# Patient Record
Sex: MALE | Race: WHITE | NOT HISPANIC OR LATINO | Employment: OTHER | URBAN - METROPOLITAN AREA
[De-identification: names, ages, dates, MRNs, and addresses within clinical notes are randomized per-mention and may not be internally consistent; named-entity substitution may affect disease eponyms.]

---

## 2018-08-22 ENCOUNTER — TELEPHONE (OUTPATIENT)
Dept: TRANSPLANT | Facility: CLINIC | Age: 63
End: 2018-08-22

## 2018-08-22 NOTE — TELEPHONE ENCOUNTER
----- Message from Christie Aguero sent at 8/22/2018 11:37 AM CDT -----  We have the pt recorders and they are now pending review by the referral nurse.  By:Christie Aguero

## 2018-08-24 ENCOUNTER — TELEPHONE (OUTPATIENT)
Dept: TRANSPLANT | Facility: CLINIC | Age: 63
End: 2018-08-24

## 2018-08-24 NOTE — TELEPHONE ENCOUNTER
Pt records reviewed.  Pt will be referred to Hepatology due to cirrhosis secondary to HCV with liver mass suspicious for HCC with AFP 6360 and liver mass 7.2x7.2x5.7 cm  Initial referral received  from Dr. Gurpreet Hernandez  Referral letter sent to provider and patient.    Spoke to pt, informed of need for CD's with all imaging from [facility] starting in 10/2016 to present to be brought the his appt on 8/29 Wed Next week.  Pt agreed to obtain the imaging on a CD.  I also request the liver bx path slides for liver bx done on 7/2017 which liver mass was biopsied and results were neg.      Pt given instructions to facility with appt date and time.

## 2018-08-29 ENCOUNTER — TELEPHONE (OUTPATIENT)
Dept: HEPATOLOGY | Facility: CLINIC | Age: 63
End: 2018-08-29

## 2018-08-29 ENCOUNTER — OFFICE VISIT (OUTPATIENT)
Dept: HEPATOLOGY | Facility: CLINIC | Age: 63
End: 2018-08-29
Payer: MEDICARE

## 2018-08-29 ENCOUNTER — HOSPITAL ENCOUNTER (OUTPATIENT)
Dept: RADIOLOGY | Facility: HOSPITAL | Age: 63
Discharge: HOME | End: 2018-08-29
Attending: PHYSICIAN ASSISTANT
Payer: MEDICARE

## 2018-08-29 VITALS
SYSTOLIC BLOOD PRESSURE: 158 MMHG | DIASTOLIC BLOOD PRESSURE: 74 MMHG | TEMPERATURE: 98 F | OXYGEN SATURATION: 97 % | HEIGHT: 72 IN | HEART RATE: 84 BPM | RESPIRATION RATE: 18 BRPM | WEIGHT: 176.13 LBS | BODY MASS INDEX: 23.86 KG/M2

## 2018-08-29 DIAGNOSIS — C22.0 HCC (HEPATOCELLULAR CARCINOMA): ICD-10-CM

## 2018-08-29 DIAGNOSIS — K74.60 CIRRHOSIS OF LIVER WITHOUT ASCITES, UNSPECIFIED HEPATIC CIRRHOSIS TYPE: Primary | ICD-10-CM

## 2018-08-29 DIAGNOSIS — R16.0 LIVER MASS: Primary | ICD-10-CM

## 2018-08-29 DIAGNOSIS — R10.9 ABDOMINAL PAIN, UNSPECIFIED ABDOMINAL LOCATION: ICD-10-CM

## 2018-08-29 DIAGNOSIS — K74.60 CIRRHOSIS OF LIVER WITHOUT ASCITES, UNSPECIFIED HEPATIC CIRRHOSIS TYPE: ICD-10-CM

## 2018-08-29 PROCEDURE — 99999 PR PBB SHADOW E&M-EST. PATIENT-LVL IV: CPT | Mod: PBBFAC,,, | Performed by: PHYSICIAN ASSISTANT

## 2018-08-29 PROCEDURE — 99204 OFFICE O/P NEW MOD 45 MIN: CPT | Mod: S$PBB,,, | Performed by: PHYSICIAN ASSISTANT

## 2018-08-29 PROCEDURE — 88321 CONSLTJ&REPRT SLD PREP ELSWR: CPT | Mod: ,,, | Performed by: PATHOLOGY

## 2018-08-29 PROCEDURE — 71250 CT THORAX DX C-: CPT | Mod: TC

## 2018-08-29 PROCEDURE — 71250 CT THORAX DX C-: CPT | Mod: 26,,, | Performed by: RADIOLOGY

## 2018-08-29 PROCEDURE — 99214 OFFICE O/P EST MOD 30 MIN: CPT | Mod: PBBFAC,25 | Performed by: PHYSICIAN ASSISTANT

## 2018-08-29 RX ORDER — LEVOTHYROXINE SODIUM 88 UG/1
88 TABLET ORAL DAILY
COMMUNITY
Start: 2018-06-29

## 2018-08-29 RX ORDER — DEXLANSOPRAZOLE 60 MG/1
60 CAPSULE, DELAYED RELEASE ORAL DAILY
COMMUNITY
Start: 2018-08-13

## 2018-08-29 RX ORDER — LOVASTATIN 20 MG/1
20 TABLET ORAL NIGHTLY
COMMUNITY
Start: 2018-08-17

## 2018-08-29 RX ORDER — METFORMIN HYDROCHLORIDE 500 MG/1
500 TABLET, EXTENDED RELEASE ORAL
COMMUNITY
Start: 2018-06-29 | End: 2020-12-14 | Stop reason: CLARIF

## 2018-08-29 NOTE — PROGRESS NOTES
HEPATOLOGY CLINIC VISIT NOTE     REFERRING PROVIDER: Dr. Gurpreet Hernandez    REASON FOR VISIT: liver mass, elevated AFP     HISTORY: This is a 62 y.o. White male here for evaluation of presumed HCC, referred by outside facility. He has a h/o HCV cirrhosis, s/p treatment with IFN+RBV and victreles with SVR. He has a h/o a subcentimeter liver mass, followed by serial imaging. In 02/2018  mass noted to be 24 mm and AFP was 13.8. Mass was biopsied no evidence of HCC; benign liver parenchyma with cirrhosis and moderate steatosis. Pt reported severe abdominal pain starting 07/2018 accompanied by fatigue. He went to follow up and AFP repeated and had increased to 6360.     U/S from 08/14 notes a 1.7 cm mass, 9.8x7.0x7.8 cm mass and 2 cm mass not visualized. Pt had MRI 08/20 and a mass measuring 7.2x7.2x5.7 noted wih peripheral enhancement and pseudocapsule felt to be HCC. Furthermore, hepatic dome may be contiguous with the aforementioned mass and a second lesion may have coalesced with larger mass.     Pt was referred here for higher level of care. He has brought CD of imaging.     He has a h/o bladder carcinoma, noted 07/2017, s/p TURBT. Further PMH of HLD and DMII.     On most recent labs, AST 22, ALT 21. Tbili 0.5, albumin 4.5. PLTs >150    Pt denies signs of hepatic decompensation including: jaundice, dark urine, abdominal distention, hematemesis, melena, slowed mentation.    He reports RUQ pain, not responsive to tramadol.     He is here with his wife. Pt is a poor historian and is very simple. They have concerns about returning to [facility] due to transportation and had to borrow money to travel here today.     Past Medical History:   Diagnosis Date    Bladder cancer 2017    s/p TURBT    Diabetes     Hyperlipemia      Past Surgical History:   Procedure Laterality Date    BLADDER SURGERY      TURBT    FEMORAL BYPASS       FAMILY HISTORY: Negative for liver disease    SOCIAL HISTORY:   Social History     Tobacco Use    Smoking Status Not on file       Social History     Substance and Sexual Activity   Alcohol Use Not on file       Social History     Substance and Sexual Activity   Drug Use Not on file     ROS:   No fever, chills, weight loss, (+) fatigue  No chest pain, dyspnea, cough  (+) abdominal pain,no change in bowel pattern, nausea, vomiting  No headaches, visual changes  No lower extremity edema  No depression or anxiety      PHYSICAL EXAM:  Friendly Data Unavailable male, in no acute distress; alert and oriented to person, place and time  VITALS: reviewed  HEENT: Sclerae anicteric.   NECK: Supple  CVS: Regular rate and rhythm. No murmurs  LUNGS: Normal respiratory effort. Clear bilaterally  ABDOMEN: Flat, soft, TTP, abdominal distention   SKIN: Warm and dry. No jaundice, No obvious rashes.   EXTREMITIES: No lower extremity edema  NEURO/PSYCH: Normal gate. Memory intact. Thought and speech pattern appropriate. Behavior normal. No depression or anxiety noted.    RECENT LABS:  Lab Results   Component Value Date    WBC 7.84 08/29/2018    HGB 14.5 08/29/2018     08/29/2018     Lab Results   Component Value Date    INR 1.0 08/29/2018     Lab Results   Component Value Date    AST 25 08/29/2018    ALT 20 08/29/2018    BILITOT 0.6 08/29/2018    ALBUMIN 3.6 08/29/2018    ALKPHOS 97 08/29/2018    CREATININE 0.9 08/29/2018    BUN 12 08/29/2018     08/29/2018    K 4.2 08/29/2018    AFP 63732 (H) 08/29/2018     RECENT IMAGING:  External imaging reviewed    ASSESSMENT  62 y.o.  White male with:  1. HEPATOCELLULAR CARCINOMA WITH ELEVATED AFP   -- given rapid interval change, concern for aggressive HCC; outside of audra   -- CBC, CMP, PT/INR, AFP today  -- imaging to be uploaded for IR review for locoregional options    2. ABDOMINAL PAIN  -- CT chest    3. CIRRHOSIS DUE TO HCV  -- well compensated  -- s/p treatment with SVR  -- EGD to screen for EV and portal HTN, has not had one, order given for one locally.     PLAN:  1.  Labs today  2. Imaging to be reviewed at IR  3. F/u with Dr. Younger in 6 weeks     Thank you for allowing me to participate in the care of Brandon Montiel PA-C

## 2018-08-29 NOTE — LETTER
August 30, 2018      Gurpreet Hernandez MD  [address] [facility]  [place] [address]           [facility] - Hepatology  [address]  [place] [address]  Phone: [phone]  Fax: [phone]          Patient: Brandon Syed   MR Number: [id number]   YOB: 1955   Date of Visit: 8/29/2018       Dear Dr. Gurpreet Hernandez:    Thank you for referring Brandon Syed to me for evaluation. Attached you will find relevant portions of my assessment and plan of care.    If you have questions, please do not hesitate to call me. I look forward to following Brandon Syed along with you.    Sincerely,    Jamie Montiel PA-C    Enclosure  CC:  No Recipients    If you would like to receive this communication electronically, please contact [email or web address] or [phone] to request more information on [employer] access.    For providers and/or their staff who would like to refer a patient to [facility], please contact us through our one-stop-shop provider referral line, [facility], at [phone].    If you feel you have received this communication in error or would no longer like to receive these types of communications, please e-mail [email or web address]

## 2018-08-29 NOTE — PROGRESS NOTES
I have personally performed a face to face diagnostic evaluation on this patient. I have reviewed and agree with today's findings and the care plan outlined by Jamie Montiel PA-C      My findings are as follows:  Patient presents with HCV cirrhosis and large HCC on MRI    AFP >6000    Likely advanced HCC  IR conference review     I spoke to the patient and told him and his wife tht he likely has a cancer.    he will return to Jamie Montiel PA-C  for follow-up.

## 2018-08-30 ENCOUNTER — TELEPHONE (OUTPATIENT)
Dept: HEPATOLOGY | Facility: CLINIC | Age: 63
End: 2018-08-30

## 2018-08-30 NOTE — TELEPHONE ENCOUNTER
Patient: Brandon Syed       MRN: [id number]      : 1955     Age: 62 y.o.  [address] [place] [address]    Provider: SCOTT - HAILEY Hollis seen with Dr. Younger     Urgency of review: urgent    Patient Transplant Status: Not a candidate    Reason for presentation: Indeterminate lesion    Clinical Summary: 62 y.o. White male here for evaluation of presumed HCC, referred by outside facility. He has a h/o HCV cirrhosis, s/p treatment with IFN+RBV and victreles with SVR. He has a h/o a subcentimeter liver mass, followed by serial imaging. In 2018  mass noted to be 24 mm and AFP was 13.8. Mass was biopsied no evidence of HCC; benign liver parenchyma with cirrhosis and moderate steatosis. Pt reported severe abdominal pain starting 2018 accompanied by fatigue. He went to follow up and AFP repeated and had increased to 6360.      U/S from  notes a 1.7 cm mass, 9.8x7.0x7.8 cm mass and 2 cm mass not visualized. Pt had MRI  and a mass measuring 7.2x7.2x5.7 noted wih peripheral enhancement and pseudocapsule felt to be HCC. Furthermore, hepatic dome may be contiguous with the aforementioned mass and a second lesion may have coalesced with larger mass.      Pt was referred here for higher level of care. He has brought CD of imaging.      He has a h/o bladder carcinoma, noted 2017, s/p TURBT. Further PMH of HLD and DMII.     Given abdominal pain, CT done following visit, 2 lung masses with multiple small calcified granulomas noted.     AFP now >75829    Imaging to be reviewed: U/S, CT, MRI and CT chest    HCC Treatment History: none    ABO:     Platelets:   Lab Results   Component Value Date/Time     2018 05:00 PM     Creatinine:   Lab Results   Component Value Date/Time    CREATININE 0.9 2018 05:00 PM     Bilirubin:   Lab Results   Component Value Date/Time    BILITOT 0.6 2018 05:00 PM     AFP Last 3 each if available:   Lab Results   Component Value Date/Time    AFP  51134 (H) 08/29/2018 05:00 PM       MELD: MELD-Na score: 6 at 8/29/2018  5:00 PM  MELD score: 6 at 8/29/2018  5:00 PM  Calculated from:  Serum Creatinine: 0.9 mg/dL (Rounded to 1 mg/dL) at 8/29/2018  5:00 PM  Serum Sodium: 136 mmol/L at 8/29/2018  5:00 PM  Total Bilirubin: 0.6 mg/dL (Rounded to 1 mg/dL) at 8/29/2018  5:00 PM  INR(ratio): 1 at 8/29/2018  5:00 PM  Age: 62 years    Plan:     Follow-up Provider:

## 2018-08-31 ENCOUNTER — TELEPHONE (OUTPATIENT)
Dept: HEPATOLOGY | Facility: CLINIC | Age: 63
End: 2018-08-31

## 2018-08-31 NOTE — TELEPHONE ENCOUNTER
MA called patient he is unable to reached left him a message to his son who answered his phone. He said that his dad Is out of town. Inform him to please have patient to call us back.

## 2018-08-31 NOTE — TELEPHONE ENCOUNTER
----- Message from Jamie Montiel PA-C sent at 8/31/2018  2:02 PM CDT -----      ----- Message -----  From: Jamie Montiel PA-C  Sent: 8/31/2018   2:01 PM  To: Jamie Montiel PA-C    Please let him know that AFP continues to rise. We will review his labs and scans on Tuesday Thanks

## 2018-09-04 ENCOUNTER — TELEPHONE (OUTPATIENT)
Dept: HEPATOLOGY | Facility: CLINIC | Age: 63
End: 2018-09-04

## 2018-09-04 ENCOUNTER — CONFERENCE (OUTPATIENT)
Dept: TRANSPLANT | Facility: CLINIC | Age: 63
End: 2018-09-04

## 2018-09-04 DIAGNOSIS — C22.0 HCC (HEPATOCELLULAR CARCINOMA): Primary | ICD-10-CM

## 2018-09-04 DIAGNOSIS — R91.8 LUNG NODULES: ICD-10-CM

## 2018-09-04 NOTE — TELEPHONE ENCOUNTER
MA called patient inform him of the message below. He understood he said he have not heard anything from IR to schedule his consult he said that he is been waiting.

## 2018-09-04 NOTE — TELEPHONE ENCOUNTER
Please tell him that per Dr. Younger, he must follow up with PCP for pain and sleep trouble.     Please recall for CT chest in 6 months

## 2018-09-04 NOTE — TELEPHONE ENCOUNTER
Patient: Brandon Syed       MRN: [id number]      : 1955     Age: 62 y.o.  [address] [place] [address]    Provider: SCOTT - HAILEY Hollis seen with Dr. Younger     Urgency of review: urgent    Patient Transplant Status: Not a candidate    Reason for presentation: Indeterminate lesion    Clinical Summary: 62 y.o. White male here for evaluation of presumed HCC, referred by outside facility. He has a h/o HCV cirrhosis, s/p treatment with IFN+RBV and victreles with SVR. He has a h/o a subcentimeter liver mass, followed by serial imaging. In 2018  mass noted to be 24 mm and AFP was 13.8. Mass was biopsied no evidence of HCC; benign liver parenchyma with cirrhosis and moderate steatosis. Pt reported severe abdominal pain starting 2018 accompanied by fatigue. He went to follow up and AFP repeated and had increased to 6360.      U/S from  notes a 1.7 cm mass, 9.8x7.0x7.8 cm mass and 2 cm mass not visualized. Pt had MRI  and a mass measuring 7.2x7.2x5.7 noted wih peripheral enhancement and pseudocapsule felt to be HCC. Furthermore, hepatic dome may be contiguous with the aforementioned mass and a second lesion may have coalesced with larger mass.      Pt was referred here for higher level of care. He has brought CD of imaging.      He has a h/o bladder carcinoma, noted 2017, s/p TURBT. Further PMH of HLD and DMII.     Given abdominal pain, CT done following visit, 2 lung masses with multiple small calcified granulomas noted.     AFP now >31886    Imaging to be reviewed: U/S, CT, MRI and CT chest    HCC Treatment History: none    ABO:     Platelets:   Lab Results   Component Value Date/Time     2018 05:00 PM     Creatinine:   Lab Results   Component Value Date/Time    CREATININE 0.9 2018 05:00 PM     Bilirubin:   Lab Results   Component Value Date/Time    BILITOT 0.6 2018 05:00 PM     AFP Last 3 each if available:   Lab Results   Component Value Date/Time    AFP  18149 (H) 08/29/2018 05:00 PM       MELD: MELD-Na score: 6 at 8/29/2018  5:00 PM  MELD score: 6 at 8/29/2018  5:00 PM  Calculated from:  Serum Creatinine: 0.9 mg/dL (Rounded to 1 mg/dL) at 8/29/2018  5:00 PM  Serum Sodium: 136 mmol/L at 8/29/2018  5:00 PM  Total Bilirubin: 0.6 mg/dL (Rounded to 1 mg/dL) at 8/29/2018  5:00 PM  INR(ratio): 1 at 8/29/2018  5:00 PM  Age: 62 years    Plan:Pulmonary nodules on CT are non specific. Rec continued surveillance. Fleischer guidelines rec f/u in 6-12 months. MRI shows 8.1 cm heterogeneous mass in segments 5/9 and possibly extending to segment 7. Theres associated PVT. Appearance suggests infiltrative process which given AFP is favored to be HCC. Rec Y90 is lung shunt allows.     Irregular, heterogeneous mass; portal vein thrombus. Recommend IR consult for Y90.        Follow-up Provider: HAILEY Hollis

## 2018-09-04 NOTE — TELEPHONE ENCOUNTER
----- Message from Ricardo Younger MD sent at 9/4/2018  2:30 PM CDT -----  6 months    He should go to his PCP for a sleep aid or pain relief.    ----- Message -----  From: Jamie Montiel PA-C  Sent: 9/4/2018   2:01 PM  To: Ricardo Younger MD    IR recommended repeat CT chest in 6-12 months, what would you prefer? He is requesting something for abdominal and back pain. He states he can't sleep.

## 2018-09-05 NOTE — TELEPHONE ENCOUNTER
Pt contacted by phone and IR consult appt confirmed 9/13 at 2:00 per Dolly in IR. Pt verbalized understanding and agreement.    SCC

## 2018-09-13 ENCOUNTER — INITIAL CONSULT (OUTPATIENT)
Dept: INTERVENTIONAL RADIOLOGY/VASCULAR | Facility: CLINIC | Age: 63
End: 2018-09-13
Payer: MEDICARE

## 2018-09-13 VITALS
HEIGHT: 72 IN | BODY MASS INDEX: 23.86 KG/M2 | DIASTOLIC BLOOD PRESSURE: 75 MMHG | SYSTOLIC BLOOD PRESSURE: 156 MMHG | WEIGHT: 176.19 LBS | HEART RATE: 67 BPM

## 2018-09-13 DIAGNOSIS — C22.0 HCC (HEPATOCELLULAR CARCINOMA): Primary | ICD-10-CM

## 2018-09-13 PROCEDURE — 99999 PR PBB SHADOW E&M-EST. PATIENT-LVL III: CPT | Mod: PBBFAC,,,

## 2018-09-13 PROCEDURE — 99214 OFFICE O/P EST MOD 30 MIN: CPT | Mod: S$PBB,,, | Performed by: RADIOLOGY

## 2018-09-13 PROCEDURE — 99213 OFFICE O/P EST LOW 20 MIN: CPT | Mod: PBBFAC

## 2018-09-13 NOTE — LETTER
[facility] - Interventional Rad  [address]  [facility] [address]  Phone: [phone] PRE-PROCEDURE INSTRUCTIONS    Your procedure is scheduled for 9/27/2018. Please arrive by 10:00am.    You must check-in and receive a wristband before going to your procedure. Your check-in location is [facility].    **Do not eat or drink anything between midnight and the time of your procedure. This includes gum, mints, and dandy lemon drops.    **Do not smoke or drink alcoholic beverages 24 hours prior to your procedure.    **Bathe the night before AND the morning of your procedure with chlorohexidine wash such as Hibiclens. This helps to keep your skin as bacteria free as possible.    **If you wear contact lenses, dentures, hearing aids, or glasses, bring a container to put them in during the procedure and give them to a family member for safekeeping.    **If you have been diagnosed with sleep apnea please bring your CPAP machine.    **If your doctor has scheduled you for an overnight stay, bring a small overnight bag with any personal items that you may need.    **Make arrangements in advance for transportation home by a responsible adult. It is not safe to drive a vehicle during the 24 hours following the procedure.    **All [facility] facilities and properties are tobacco free. Smoking is NOT allowed.    PLEASE NOTE: The procedure schedule has many variables which affect the time of your procedure. Family members should be available if your surgery time changes.    If you have any questions about these instructions call Interventional Radiology at [phone] or [phone].

## 2018-09-13 NOTE — PROGRESS NOTES
Subjective:       Patient ID: Brandon Syed is a 62 y.o. male.    Chief Complaint: Hepatocellular Carcinoma    Patient here to discuss treatment of hepatocellular carcinoma (HCC) recently identified via MRI during workup for abdominal pain. He has a history of HCV that was treated. During routine surveillance in February of this year, a liver lesion was identified and biopsied. Biopsy did not show HCC. In July 2018 he went to the ER for abdominal pain. An MRI was obtained and showed the lesion had grown to 7cm. He continues to have intermittent abdominal pain. He denies any abdominal distention. He is accompanied by his daughter. He was reviewed in liver conference.       Review of Systems   Constitutional: Positive for fatigue. Negative for activity change, appetite change, chills and fever.   HENT: Positive for hearing loss (needs hearing aid) and tinnitus. Negative for congestion, drooling, sneezing and trouble swallowing.    Eyes: Negative for pain, discharge, redness and itching.        Wears glasses   Respiratory: Negative for cough, shortness of breath, wheezing and stridor.    Cardiovascular: Negative for chest pain, palpitations and leg swelling.   Gastrointestinal: Positive for abdominal pain. Negative for abdominal distention, constipation, diarrhea, nausea and vomiting.   Genitourinary: Negative for difficulty urinating, dysuria, frequency and urgency.   Musculoskeletal: Positive for arthralgias, back pain and neck pain. Negative for gait problem, joint swelling and myalgias.   Skin: Negative for color change, pallor and rash.   Neurological: Positive for weakness (occasional). Negative for dizziness and headaches.       Objective:      Physical Exam   Constitutional: He is oriented to person, place, and time. He appears well-developed and well-nourished. No distress.   HENT:   Head: Normocephalic and atraumatic.   Right Ear: External ear normal.   Left Ear: External ear normal.   Nose: Nose normal.    Mouth/Throat: Oropharynx is clear and moist. No oropharyngeal exudate.   Eyes: Conjunctivae are normal. Pupils are equal, round, and reactive to light. Right eye exhibits no discharge. Left eye exhibits no discharge. No scleral icterus.   Neck: Neck supple. No JVD present. No tracheal deviation present. No thyromegaly present.   Cardiovascular: Normal rate, regular rhythm, normal heart sounds and intact distal pulses. Exam reveals no gallop and no friction rub.   No murmur heard.  Pulmonary/Chest: Effort normal and breath sounds normal. No stridor. No respiratory distress. He has no wheezes. He has no rales.   Abdominal: Soft. Bowel sounds are normal. He exhibits no distension and no mass. There is no hepatosplenomegaly. There is no tenderness. There is no rebound and no guarding.   Musculoskeletal: He exhibits no edema.   Lymphadenopathy:     He has no cervical adenopathy.   Neurological: He is alert and oriented to person, place, and time. Gait normal.   Skin: Skin is warm and dry. He is not diaphoretic. No cyanosis. Nails show no clubbing.   Psychiatric: He has a normal mood and affect.   Vitals reviewed.      Assessment:       1. HCC (hepatocellular carcinoma)        Plan:         Discussed criteria for diagnosing HCC via imaging and AFP. Explained recommendation of liver conference is to treat with radioembolization. Yttrium 90 Radioembolization discussed in detail with the patient including risks, benefits, potential complications, usual pre and post procedure course.  Discussed the need for initial Angiogram mapping study prior to scheduling the actual Radioembolization procedure. Utilized images from patient's MRI, the internet, and illustrations to help explain procedure. Patient and daughter verbalize understanding and agreement. Patient scheduled for mapping on 9/27/2018. Pre-procedure handout with clinic phone number provided.

## 2018-09-21 ENCOUNTER — TELEPHONE (OUTPATIENT)
Dept: HEPATOLOGY | Facility: CLINIC | Age: 63
End: 2018-09-21

## 2018-09-21 NOTE — TELEPHONE ENCOUNTER
----- Message from Kath Vuong MA sent at 9/20/2018  9:34 AM CDT -----      ----- Message -----  From: Jamie Montiel PA-C  Sent: 9/20/2018   8:44 AM  To: [facility] Hepat Non-Clinical Staff    Please let him know that his original biopsy slides were reviewed and they did not note HCC on those, he should continue f/u with IR as this doesn't impact our plan

## 2018-09-26 DIAGNOSIS — C22.0 HCC (HEPATOCELLULAR CARCINOMA): Primary | ICD-10-CM

## 2018-09-27 ENCOUNTER — HOSPITAL ENCOUNTER (OUTPATIENT)
Dept: RADIOLOGY | Facility: HOSPITAL | Age: 63
Discharge: HOME | End: 2018-09-27
Attending: FAMILY MEDICINE | Admitting: RADIOLOGY
Payer: MEDICARE

## 2018-09-27 ENCOUNTER — HOSPITAL ENCOUNTER (OUTPATIENT)
Facility: HOSPITAL | Age: 63
Discharge: HOME | End: 2018-09-27
Attending: RADIOLOGY | Admitting: RADIOLOGY
Payer: MEDICARE

## 2018-09-27 VITALS
HEART RATE: 71 BPM | RESPIRATION RATE: 18 BRPM | HEIGHT: 72 IN | WEIGHT: 177 LBS | SYSTOLIC BLOOD PRESSURE: 174 MMHG | DIASTOLIC BLOOD PRESSURE: 95 MMHG | BODY MASS INDEX: 23.98 KG/M2 | TEMPERATURE: 98 F | OXYGEN SATURATION: 100 %

## 2018-09-27 DIAGNOSIS — C22.0 HCC (HEPATOCELLULAR CARCINOMA): ICD-10-CM

## 2018-09-27 LAB — POCT GLUCOSE: 116 MG/DL (ref 70–110)

## 2018-09-27 PROCEDURE — 25000003 PHARM REV CODE 250: Performed by: RADIOLOGY

## 2018-09-27 PROCEDURE — 63600175 PHARM REV CODE 636 W HCPCS: Performed by: RADIOLOGY

## 2018-09-27 PROCEDURE — 82962 GLUCOSE BLOOD TEST: CPT

## 2018-09-27 PROCEDURE — 63600175 PHARM REV CODE 636 W HCPCS: Performed by: NURSE PRACTITIONER

## 2018-09-27 PROCEDURE — A9540 TC99M MAA: HCPCS

## 2018-09-27 PROCEDURE — 78201 LIVER IMAGING STATIC ONLY: CPT | Mod: TC

## 2018-09-27 PROCEDURE — 78201 LIVER IMAGING STATIC ONLY: CPT | Mod: 26,,, | Performed by: RADIOLOGY

## 2018-09-27 PROCEDURE — 25000003 PHARM REV CODE 250: Performed by: NURSE PRACTITIONER

## 2018-09-27 RX ORDER — ONDANSETRON 2 MG/ML
INJECTION INTRAMUSCULAR; INTRAVENOUS CODE/TRAUMA/SEDATION MEDICATION
Status: COMPLETED | OUTPATIENT
Start: 2018-09-27 | End: 2018-09-27

## 2018-09-27 RX ORDER — OXYCODONE AND ACETAMINOPHEN 5; 325 MG/1; MG/1
1 TABLET ORAL EVERY 6 HOURS PRN
Qty: 15 TABLET | Refills: 0 | Status: SHIPPED | OUTPATIENT
Start: 2018-09-27 | End: 2019-07-25 | Stop reason: ALTCHOICE

## 2018-09-27 RX ORDER — ONDANSETRON 4 MG/1
4 TABLET, ORALLY DISINTEGRATING ORAL EVERY 6 HOURS PRN
Qty: 15 TABLET | Refills: 0 | Status: SHIPPED | OUTPATIENT
Start: 2018-09-27 | End: 2019-07-25 | Stop reason: ALTCHOICE

## 2018-09-27 RX ORDER — MIDAZOLAM HYDROCHLORIDE 1 MG/ML
INJECTION INTRAMUSCULAR; INTRAVENOUS CODE/TRAUMA/SEDATION MEDICATION
Status: COMPLETED | OUTPATIENT
Start: 2018-09-27 | End: 2018-09-27

## 2018-09-27 RX ORDER — LIDOCAINE HYDROCHLORIDE 10 MG/ML
1 INJECTION, SOLUTION EPIDURAL; INFILTRATION; INTRACAUDAL; PERINEURAL ONCE AS NEEDED
Status: COMPLETED | OUTPATIENT
Start: 2018-09-27 | End: 2018-09-27

## 2018-09-27 RX ORDER — AMOXICILLIN AND CLAVULANATE POTASSIUM 875; 125 MG/1; MG/1
1 TABLET, FILM COATED ORAL EVERY 12 HOURS
Qty: 14 TABLET | Refills: 0 | Status: SHIPPED | OUTPATIENT
Start: 2018-09-27 | End: 2018-10-04

## 2018-09-27 RX ORDER — HEPARIN SODIUM 1000 [USP'U]/ML
INJECTION, SOLUTION INTRAVENOUS; SUBCUTANEOUS CODE/TRAUMA/SEDATION MEDICATION
Status: COMPLETED | OUTPATIENT
Start: 2018-09-27 | End: 2018-09-27

## 2018-09-27 RX ORDER — HEPARIN SODIUM 200 [USP'U]/100ML
500 INJECTION, SOLUTION INTRAVENOUS CONTINUOUS
Status: ACTIVE | OUTPATIENT
Start: 2018-09-27

## 2018-09-27 RX ORDER — SODIUM CHLORIDE 9 MG/ML
INJECTION, SOLUTION INTRAVENOUS CONTINUOUS
Status: ACTIVE | OUTPATIENT
Start: 2018-09-27

## 2018-09-27 RX ORDER — NITROGLYCERIN 5 MG/ML
INJECTION, SOLUTION INTRAVENOUS CODE/TRAUMA/SEDATION MEDICATION
Status: COMPLETED | OUTPATIENT
Start: 2018-09-27 | End: 2018-09-27

## 2018-09-27 RX ORDER — FENTANYL CITRATE 50 UG/ML
INJECTION, SOLUTION INTRAMUSCULAR; INTRAVENOUS CODE/TRAUMA/SEDATION MEDICATION
Status: COMPLETED | OUTPATIENT
Start: 2018-09-27 | End: 2018-09-27

## 2018-09-27 RX ORDER — OXYCODONE HYDROCHLORIDE 5 MG/1
5 TABLET ORAL ONCE
Status: COMPLETED | OUTPATIENT
Start: 2018-09-27 | End: 2018-09-27

## 2018-09-27 RX ADMIN — SODIUM CHLORIDE 3 G: 9 INJECTION, SOLUTION INTRAVENOUS at 11:09

## 2018-09-27 RX ADMIN — HEPARIN SODIUM 1000 UNITS: 1000 INJECTION, SOLUTION INTRAVENOUS; SUBCUTANEOUS at 12:09

## 2018-09-27 RX ADMIN — MIDAZOLAM HYDROCHLORIDE 1 MG: 1 INJECTION, SOLUTION INTRAMUSCULAR; INTRAVENOUS at 11:09

## 2018-09-27 RX ADMIN — NITROGLYCERIN 300 MCG: 5 INJECTION, SOLUTION INTRAVENOUS at 01:09

## 2018-09-27 RX ADMIN — MIDAZOLAM HYDROCHLORIDE 0.5 MG: 1 INJECTION, SOLUTION INTRAMUSCULAR; INTRAVENOUS at 12:09

## 2018-09-27 RX ADMIN — FENTANYL CITRATE 50 MCG: 50 INJECTION, SOLUTION INTRAMUSCULAR; INTRAVENOUS at 11:09

## 2018-09-27 RX ADMIN — OXYCODONE HYDROCHLORIDE 5 MG: 5 TABLET ORAL at 02:09

## 2018-09-27 RX ADMIN — LIDOCAINE HYDROCHLORIDE 10 MG: 10 INJECTION, SOLUTION EPIDURAL; INFILTRATION; INTRACAUDAL at 11:09

## 2018-09-27 RX ADMIN — FENTANYL CITRATE 25 MCG: 50 INJECTION, SOLUTION INTRAMUSCULAR; INTRAVENOUS at 12:09

## 2018-09-27 RX ADMIN — HEPARIN SODIUM 1000 UNITS: 1000 INJECTION, SOLUTION INTRAVENOUS; SUBCUTANEOUS at 01:09

## 2018-09-27 RX ADMIN — HEPARIN SODIUM 3000 UNITS: 1000 INJECTION, SOLUTION INTRAVENOUS; SUBCUTANEOUS at 12:09

## 2018-09-27 RX ADMIN — SODIUM CHLORIDE: 0.9 INJECTION, SOLUTION INTRAVENOUS at 11:09

## 2018-09-27 RX ADMIN — ONDANSETRON 4 MG: 2 INJECTION INTRAMUSCULAR; INTRAVENOUS at 12:09

## 2018-09-27 RX ADMIN — FENTANYL CITRATE 50 MCG: 50 INJECTION, SOLUTION INTRAMUSCULAR; INTRAVENOUS at 01:09

## 2018-09-27 RX ADMIN — NITROGLYCERIN 400 MCG: 5 INJECTION, SOLUTION INTRAVENOUS at 12:09

## 2018-09-27 NOTE — PROGRESS NOTES
Pre yttrium complete. Hemostasis achieved via left wrist with use of TR band at 1310. 15 Mls air instilled. No acute events. Pt resting comfortably, denies pain/discomfort. Pt to Nuc Med for post op scan then to ROCU. Chart and all personal items sent with patient.

## 2018-09-27 NOTE — PROGRESS NOTES
TR band removed. Slight pressure dressing applied to left wrist. Dr Ramirez to bedside.  Pt and wife verbalized understanding of discharge (AVS) instructions.VSS. No complaints at this time. IV Dc'd. Pt ambulated to [facility] to travel home with wife.

## 2018-09-27 NOTE — DISCHARGE INSTRUCTIONS
Interventional Radiology [phone]  Mon-Fri  8A-4P  24hr Radiology Resident on call [phone]

## 2018-09-27 NOTE — H&P
Radiology History & Physical      SUBJECTIVE:     Chief Complaint: Preprocedure    History of Present Illness:  Brandon Syed is a 62 y.o. male who presents for Y90 mapping.  Past Medical History:   Diagnosis Date    Bladder cancer 2017    s/p TURBT    Diabetes     Hyperlipemia      Past Surgical History:   Procedure Laterality Date    BLADDER SURGERY      TURBT    FEMORAL BYPASS         Home Meds:   Prior to Admission medications    Medication Sig Start Date End Date Taking? Authorizing Provider   DEXILANT 60 mg capsule Take 60 mg by mouth once daily. 8/13/18   Historical Provider, MD   levothyroxine (SYNTHROID) 50 MCG tablet Take 50 mcg by mouth once daily. 6/29/18   Historical Provider, MD   lovastatin (MEVACOR) 20 MG tablet Take 20 mg by mouth once daily. 8/17/18   Historical Provider, MD   metFORMIN (GLUCOPHAGE-XR) 500 MG 24 hr tablet Take 500 mg by mouth once daily. 6/29/18   Historical Provider, MD     Anticoagulants/Antiplatelets: no anticoagulation    Allergies: Review of patient's allergies indicates:  No Known Allergies  Sedation History:  no adverse reactions    Review of Systems:   Hematological: no known coagulopathies  Respiratory: no shortness of breath  Cardiovascular: no chest pain  Gastrointestinal: no abdominal pain  Genito-Urinary: no dysuria  Musculoskeletal: negative  Neurological: no TIA or stroke symptoms         OBJECTIVE:     Vital Signs (Most Recent)  Temp: 98.6 °F (37 °C) (09/27/18 1025)  Pulse: 76 (09/27/18 1025)  Resp: 18 (09/27/18 1025)  BP: (!) 183/79 (09/27/18 1025)  SpO2: 98 % (09/27/18 1025)    Physical Exam:  ASA: 2  Mallampati: 2  General: no acute distress  Mental Status: alert and oriented to person, place and time  HEENT: normocephalic, atraumatic  Chest: unlabored breathing  Heart: regular heart rate  Abdomen: nondistended  Extremity: moves all extremities    Laboratory  Lab Results   Component Value Date    INR 0.9 09/27/2018       Lab Results   Component Value Date     WBC 11.47 09/27/2018    HGB 15.6 09/27/2018    HCT 46.6 09/27/2018    MCV 88 09/27/2018     09/27/2018      Lab Results   Component Value Date     (H) 09/27/2018     09/27/2018    K 4.6 09/27/2018     09/27/2018    CO2 24 09/27/2018    BUN 13 09/27/2018    CREATININE 0.9 09/27/2018    CALCIUM 10.2 09/27/2018    ALT 46 (H) 09/27/2018    AST 34 09/27/2018    ALBUMIN 4.0 09/27/2018    BILITOT 0.6 09/27/2018    BILIDIR 0.3 09/27/2018       ASSESSMENT/PLAN:     Sedation Plan: Moderate Sedation  Patient will undergo Y90 mapping.    Randall oRjo MD  Interventional Radiology PGY-2  [facility]

## 2018-09-27 NOTE — PROGRESS NOTES
Pt to [facility]. Report received from LOURDES Conrad. No complaints or signs of discomfort at this time. Will continue to monitor.  Family at bedside.

## 2018-10-02 DIAGNOSIS — C22.0 HCC (HEPATOCELLULAR CARCINOMA): Primary | ICD-10-CM

## 2018-10-08 ENCOUNTER — TELEPHONE (OUTPATIENT)
Dept: HEPATOLOGY | Facility: CLINIC | Age: 63
End: 2018-10-08

## 2018-10-08 NOTE — TELEPHONE ENCOUNTER
----- Message from Kath Vuong MA sent at 10/8/2018 12:29 PM CDT -----      ----- Message -----  From: Yashira Millan  Sent: 10/8/2018  10:55 AM  To: [facility] Hepat Clinical Staff    Pt hasn't begun radiation as of yet and wants to know should he still come to appt on 10/10    pls call to advise [phone]

## 2018-10-08 NOTE — TELEPHONE ENCOUNTER
Call returned. Pt's wife answered. Explained that pt should keep his appt with MD this week if possible. She verbalized understanding.     SCC

## 2018-10-10 ENCOUNTER — OFFICE VISIT (OUTPATIENT)
Dept: HEPATOLOGY | Facility: CLINIC | Age: 63
End: 2018-10-10
Payer: MEDICARE

## 2018-10-10 VITALS
HEART RATE: 76 BPM | WEIGHT: 176.81 LBS | DIASTOLIC BLOOD PRESSURE: 71 MMHG | BODY MASS INDEX: 23.95 KG/M2 | HEIGHT: 72 IN | SYSTOLIC BLOOD PRESSURE: 150 MMHG | TEMPERATURE: 97 F | RESPIRATION RATE: 18 BRPM | OXYGEN SATURATION: 97 %

## 2018-10-10 DIAGNOSIS — C22.0 HCC (HEPATOCELLULAR CARCINOMA): Primary | ICD-10-CM

## 2018-10-10 PROCEDURE — 99213 OFFICE O/P EST LOW 20 MIN: CPT | Mod: PBBFAC | Performed by: INTERNAL MEDICINE

## 2018-10-10 PROCEDURE — 99214 OFFICE O/P EST MOD 30 MIN: CPT | Mod: S$PBB,,, | Performed by: INTERNAL MEDICINE

## 2018-10-10 PROCEDURE — 99999 PR PBB SHADOW E&M-EST. PATIENT-LVL III: CPT | Mod: PBBFAC,,, | Performed by: INTERNAL MEDICINE

## 2018-10-10 NOTE — PROGRESS NOTES
Subjective:       Patient ID: Brandon Syed is a 62 y.o. male.    Chief Complaint: Hepatocellular Carcinoma    HPI  I saw this 62 y.o. man who was seen in Aug 2018 for evaluation of presumed HCC.    He has a h/o HCV cirrhosis, s/p treatment with IFN+RBV and victralis with SVR.     He has a h/o a subcentimeter liver mass, followed by serial imaging. In 02/2018  mass noted to be 24 mm and AFP was 13.8. Mass was biopsied no evidence of HCC; benign liver parenchyma with cirrhosis and moderate steatosis.    Pt reported severe abdominal pain starting 07/2018 accompanied by fatigue. He went to follow up and AFP repeated and had increased to 6360.      MRI 08/20 and a mass measuring 7.2x7.2x5.7 noted wih peripheral enhancement and pseudocapsule felt to be HCC. Furthermore, hepatic dome may be contiguous with the aforementioned mass and a second lesion may have coalesced with larger mass.       He has a h/o bladder carcinoma, noted 07/2017, s/p TURBT. Further PMH of HLD and DMII.      He is here with his wife.     AFP 31759 on 8/29/2018    IR conference review:  Pulmonary nodules on CT are non specific. Rec continued surveillance. Fleischer guidelines rec f/u in 6-12 months. MRI shows 8.1 cm heterogeneous mass in segments 5/9 and possibly extending to segment 7. Theres associated PVT. Appearance suggests infiltrative process which given AFP is favored to be HCC. Rec Y90.    Underwent mapping on 9/27/2018  Due for Y90 on 10/18/18      PMH:  aortofemoral bypass    Review of Systems   Constitutional: Negative for activity change, appetite change, chills, fatigue, fever and unexpected weight change.   HENT: Negative for hearing loss.    Eyes: Negative for discharge and visual disturbance.   Respiratory: Negative for cough, chest tightness, shortness of breath and wheezing.    Cardiovascular: Negative for chest pain, palpitations and leg swelling.   Gastrointestinal: Negative for abdominal distention, abdominal pain,  constipation, diarrhea and nausea.   Genitourinary: Negative for dysuria and frequency.   Musculoskeletal: Negative for arthralgias and back pain.   Skin: Negative for pallor and rash.   Neurological: Negative for dizziness, tremors, speech difficulty and headaches.   Hematological: Negative for adenopathy.   Psychiatric/Behavioral: Negative for agitation and confusion.            Lab Results   Component Value Date    ALT 46 (H) 09/27/2018    AST 34 09/27/2018    ALKPHOS 104 09/27/2018    BILITOT 0.6 09/27/2018     Past Medical History:   Diagnosis Date    Arthritis     Bladder cancer 2017    s/p TURBT    Diabetes     Hyperlipemia      Past Surgical History:   Procedure Laterality Date    BLADDER SURGERY      TURBT    EMBOLIZATION, ARTERY, LIVER, FOR SELECTIVE INTERNAL RADIATION THERAPY USING YTTRIUM-90 MICROSPHERES N/A 9/27/2018    Performed by [facility] Diagnostic Provider at [facility] OR [address] [place] [address]    FEMORAL BYPASS       Current Outpatient Medications   Medication Sig    DEXILANT 60 mg capsule Take 60 mg by mouth once daily.    levothyroxine (SYNTHROID) 50 MCG tablet Take 50 mcg by mouth once daily.    lovastatin (MEVACOR) 20 MG tablet Take 20 mg by mouth once daily.    metFORMIN (GLUCOPHAGE-XR) 500 MG 24 hr tablet Take 500 mg by mouth once daily.    ondansetron (ZOFRAN-ODT) 4 MG TbDL Take 1 tablet (4 mg total) by mouth every 6 (six) hours as needed.    oxyCODONE-acetaminophen (PERCOCET) 5-325 mg per tablet Take 1 tablet by mouth every 6 (six) hours as needed for Pain.     No current facility-administered medications for this visit.      Facility-Administered Medications Ordered in Other Visits   Medication    0.9%  NaCl infusion    heparin infusion 1,000 units/500 ml in 0.9% NaCl (pressure line flush)       Objective:      Physical Exam   Constitutional: He is oriented to person, place, and time. He appears well-nourished.   HENT:   Head: Normocephalic.   Eyes: Pupils are equal, round, and reactive to light.    Neck: No thyromegaly present.   Cardiovascular: Normal rate, regular rhythm and normal heart sounds.   Pulmonary/Chest: Effort normal and breath sounds normal. He has no wheezes.   Abdominal: Soft. He exhibits no distension and no mass. There is no tenderness.       Lymphadenopathy:     He has no cervical adenopathy.   Neurological: He is alert and oriented to person, place, and time.   Skin: Skin is warm. No rash noted. No erythema.   Psychiatric: He has a normal mood and affect. His behavior is normal.       Assessment:       1. HCC (hepatocellular carcinoma)        Plan:   HCV cirrhosis with advanced HCC, scheduled for Y90 on 10/18/2018  I will see him back in clinic 3 months after his treatment on the same day as his imaging study.    3 months.

## 2018-10-17 ENCOUNTER — TELEPHONE (OUTPATIENT)
Dept: INTERVENTIONAL RADIOLOGY/VASCULAR | Facility: HOSPITAL | Age: 63
End: 2018-10-17

## 2018-10-17 VITALS — BODY MASS INDEX: 23.84 KG/M2 | WEIGHT: 176 LBS | HEIGHT: 72 IN

## 2018-10-17 DIAGNOSIS — C22.0 HCC (HEPATOCELLULAR CARCINOMA): Primary | ICD-10-CM

## 2018-10-18 ENCOUNTER — HOSPITAL ENCOUNTER (OUTPATIENT)
Facility: HOSPITAL | Age: 63
Discharge: HOME | End: 2018-10-18
Attending: RADIOLOGY | Admitting: RADIOLOGY
Payer: MEDICARE

## 2018-10-18 ENCOUNTER — HOSPITAL ENCOUNTER (OUTPATIENT)
Dept: RADIOLOGY | Facility: HOSPITAL | Age: 63
Discharge: HOME | End: 2018-10-18
Attending: FAMILY MEDICINE | Admitting: RADIOLOGY
Payer: MEDICARE

## 2018-10-18 VITALS
RESPIRATION RATE: 16 BRPM | TEMPERATURE: 98 F | WEIGHT: 176 LBS | SYSTOLIC BLOOD PRESSURE: 127 MMHG | BODY MASS INDEX: 23.84 KG/M2 | HEIGHT: 72 IN | HEART RATE: 87 BPM | DIASTOLIC BLOOD PRESSURE: 58 MMHG | OXYGEN SATURATION: 95 %

## 2018-10-18 DIAGNOSIS — R16.0 LIVER MASS: ICD-10-CM

## 2018-10-18 DIAGNOSIS — C22.0 HCC (HEPATOCELLULAR CARCINOMA): ICD-10-CM

## 2018-10-18 PROCEDURE — 82962 GLUCOSE BLOOD TEST: CPT

## 2018-10-18 PROCEDURE — 78201 LIVER IMAGING STATIC ONLY: CPT | Mod: 26,,, | Performed by: RADIOLOGY

## 2018-10-18 PROCEDURE — 25500020 PHARM REV CODE 255: Performed by: RADIOLOGY

## 2018-10-18 PROCEDURE — 25000003 PHARM REV CODE 250: Performed by: NURSE PRACTITIONER

## 2018-10-18 PROCEDURE — 63600175 PHARM REV CODE 636 W HCPCS: Performed by: NURSE PRACTITIONER

## 2018-10-18 PROCEDURE — 25000003 PHARM REV CODE 250: Performed by: RADIOLOGY

## 2018-10-18 PROCEDURE — 63600175 PHARM REV CODE 636 W HCPCS: Performed by: RADIOLOGY

## 2018-10-18 PROCEDURE — 78201 LIVER IMAGING STATIC ONLY: CPT | Mod: TC

## 2018-10-18 RX ORDER — ESOMEPRAZOLE MAGNESIUM 40 MG/1
40 CAPSULE, DELAYED RELEASE ORAL
Qty: 30 CAPSULE | Refills: 0 | Status: SHIPPED | OUTPATIENT
Start: 2018-10-18 | End: 2019-06-18

## 2018-10-18 RX ORDER — NITROGLYCERIN 5 MG/ML
INJECTION, SOLUTION INTRAVENOUS CODE/TRAUMA/SEDATION MEDICATION
Status: COMPLETED | OUTPATIENT
Start: 2018-10-18 | End: 2018-10-18

## 2018-10-18 RX ORDER — HEPARIN SODIUM 200 [USP'U]/100ML
500 INJECTION, SOLUTION INTRAVENOUS CONTINUOUS
Status: DISCONTINUED | OUTPATIENT
Start: 2018-10-18 | End: 2018-10-18 | Stop reason: HOSPADM

## 2018-10-18 RX ORDER — FENTANYL CITRATE 50 UG/ML
INJECTION, SOLUTION INTRAMUSCULAR; INTRAVENOUS CODE/TRAUMA/SEDATION MEDICATION
Status: COMPLETED | OUTPATIENT
Start: 2018-10-18 | End: 2018-10-18

## 2018-10-18 RX ORDER — SODIUM CHLORIDE 9 MG/ML
INJECTION, SOLUTION INTRAVENOUS CONTINUOUS
Status: DISCONTINUED | OUTPATIENT
Start: 2018-10-18 | End: 2018-10-18 | Stop reason: HOSPADM

## 2018-10-18 RX ORDER — MIDAZOLAM HYDROCHLORIDE 1 MG/ML
INJECTION INTRAMUSCULAR; INTRAVENOUS CODE/TRAUMA/SEDATION MEDICATION
Status: COMPLETED | OUTPATIENT
Start: 2018-10-18 | End: 2018-10-18

## 2018-10-18 RX ORDER — ONDANSETRON 2 MG/ML
INJECTION INTRAMUSCULAR; INTRAVENOUS CODE/TRAUMA/SEDATION MEDICATION
Status: COMPLETED | OUTPATIENT
Start: 2018-10-18 | End: 2018-10-18

## 2018-10-18 RX ORDER — HEPARIN SODIUM 1000 [USP'U]/ML
INJECTION, SOLUTION INTRAVENOUS; SUBCUTANEOUS CODE/TRAUMA/SEDATION MEDICATION
Status: COMPLETED | OUTPATIENT
Start: 2018-10-18 | End: 2018-10-18

## 2018-10-18 RX ORDER — LIDOCAINE HYDROCHLORIDE 10 MG/ML
1 INJECTION, SOLUTION EPIDURAL; INFILTRATION; INTRACAUDAL; PERINEURAL ONCE AS NEEDED
Status: COMPLETED | OUTPATIENT
Start: 2018-10-18 | End: 2018-10-18

## 2018-10-18 RX ORDER — METHYLPREDNISOLONE 4 MG/1
TABLET ORAL
Qty: 1 PACKAGE | Refills: 0 | Status: SHIPPED | OUTPATIENT
Start: 2018-10-18 | End: 2018-11-08

## 2018-10-18 RX ADMIN — FENTANYL CITRATE 50 MCG: 50 INJECTION, SOLUTION INTRAMUSCULAR; INTRAVENOUS at 12:10

## 2018-10-18 RX ADMIN — SODIUM CHLORIDE: 0.9 INJECTION, SOLUTION INTRAVENOUS at 11:10

## 2018-10-18 RX ADMIN — MIDAZOLAM HYDROCHLORIDE 1 MG: 1 INJECTION, SOLUTION INTRAMUSCULAR; INTRAVENOUS at 12:10

## 2018-10-18 RX ADMIN — IOHEXOL 40 ML: 300 INJECTION, SOLUTION INTRAVENOUS at 01:10

## 2018-10-18 RX ADMIN — LIDOCAINE HYDROCHLORIDE 0.1 MG: 10 INJECTION, SOLUTION EPIDURAL; INFILTRATION; INTRACAUDAL at 11:10

## 2018-10-18 RX ADMIN — NITROGLYCERIN 200 MCG: 5 INJECTION, SOLUTION INTRAVENOUS at 12:10

## 2018-10-18 RX ADMIN — AMPICILLIN SODIUM AND SULBACTAM SODIUM 3 G: 2; 1 INJECTION, POWDER, FOR SOLUTION INTRAMUSCULAR; INTRAVENOUS at 11:10

## 2018-10-18 RX ADMIN — HEPARIN SODIUM 3000 UNITS: 1000 INJECTION, SOLUTION INTRAVENOUS; SUBCUTANEOUS at 12:10

## 2018-10-18 RX ADMIN — MIDAZOLAM HYDROCHLORIDE 2 MG: 1 INJECTION, SOLUTION INTRAMUSCULAR; INTRAVENOUS at 12:10

## 2018-10-18 RX ADMIN — ONDANSETRON 4 MG: 2 INJECTION INTRAMUSCULAR; INTRAVENOUS at 12:10

## 2018-10-18 NOTE — PLAN OF CARE
Pt resting comfortably.    Call light in reach.    No questions or concerns at this time.      Wife to take pt belongings

## 2018-10-18 NOTE — H&P
Radiology History & Physical      SUBJECTIVE:     Chief Complaint: preprocedure    History of Present Illness:  Brandon Syed is a 62 y.o. male with HCC who presents for Y90 radio embolization of liver lesions.     Past Medical History:   Diagnosis Date    Arthritis     Bladder cancer 2017    s/p TURBT    Chronic lower back pain     Diabetes     Hx of hepatitis C     Hyperlipemia     Upper back pain      Past Surgical History:   Procedure Laterality Date    BLADDER SURGERY      TURBT    EMBOLIZATION, ARTERY, LIVER, FOR SELECTIVE INTERNAL RADIATION THERAPY USING YTTRIUM-90 MICROSPHERES N/A 9/27/2018    Performed by [facility] Diagnostic Provider at [facility] OR [address] [place] [address]    FEMORAL BYPASS         Home Meds:   Prior to Admission medications    Medication Sig Start Date End Date Taking? Authorizing Provider   DEXILANT 60 mg capsule Take 60 mg by mouth once daily. 8/13/18  Yes Historical Provider, MD   levothyroxine (SYNTHROID) 50 MCG tablet Take 50 mcg by mouth once daily. 6/29/18  Yes Historical Provider, MD   lovastatin (MEVACOR) 20 MG tablet Take 20 mg by mouth once daily. 8/17/18  Yes Historical Provider, MD   metFORMIN (GLUCOPHAGE-XR) 500 MG 24 hr tablet Take 500 mg by mouth once daily. 6/29/18  Yes Historical Provider, MD   ondansetron (ZOFRAN-ODT) 4 MG TbDL Take 1 tablet (4 mg total) by mouth every 6 (six) hours as needed. 9/27/18  Yes Randall Rojo MD   oxyCODONE-acetaminophen (PERCOCET) 5-325 mg per tablet Take 1 tablet by mouth every 6 (six) hours as needed for Pain. 9/27/18  Yes Randall Rojo MD     Anticoagulants/Antiplatelets: no anticoagulation    Allergies: Review of patient's allergies indicates:  No Known Allergies  Sedation History:  no adverse reactions    Review of Systems:   Hematological: no known coagulopathies  Respiratory: no shortness of breath  Cardiovascular: no chest pain  Gastrointestinal: no abdominal pain  Genito-Urinary: no dysuria  Musculoskeletal: negative  Neurological: no TIA  or stroke symptoms         OBJECTIVE:     Vital Signs (Most Recent)  Temp: 97.5 °F (36.4 °C) (10/18/18 1135)  Pulse: 72 (10/18/18 1135)  Resp: 17 (10/18/18 1135)  BP: (!) 159/72 (10/18/18 1139)  SpO2: 95 % (10/18/18 1135)    Physical Exam:  ASA: 3  Mallampati: 2  General: no acute distress  Mental Status: alert and oriented to person, place and time  HEENT: normocephalic, atraumatic  Chest: unlabored breathing  Heart: regular heart rate  Abdomen: nondistended  Extremity: moves all extremities    Laboratory  Lab Results   Component Value Date    INR 0.9 10/18/2018       Lab Results   Component Value Date    WBC 8.71 10/18/2018    HGB 14.4 10/18/2018    HCT 41.0 10/18/2018    MCV 84 10/18/2018     10/18/2018      Lab Results   Component Value Date     10/18/2018     10/18/2018    K 4.6 10/18/2018     10/18/2018    CO2 25 10/18/2018    BUN 11 10/18/2018    CREATININE 0.8 10/18/2018    CALCIUM 9.6 10/18/2018    ALT 30 10/18/2018    AST 30 10/18/2018    ALBUMIN 3.7 10/18/2018    BILITOT 0.4 10/18/2018    BILIDIR 0.2 10/18/2018       ASSESSMENT/PLAN:     Sedation Plan: Moderate  Patient will undergo Y90.    Randall Rojo MD  Interventional Radiology PGY-II  [facility]  Pager: [phone]  Spectra: [phone]

## 2018-10-18 NOTE — DISCHARGE INSTRUCTIONS
Please call with any questions or concerns.      Monday thru Friday 8:00 am - 4:30 pm    Interventional Radiology   [phone]    After Hours    Ask for the Radiology Intern on call  [phone]

## 2018-10-18 NOTE — DISCHARGE SUMMARY
Radiology Discharge Summary      Hospital Course: No complications    Admit Date: 9/27/2018  Discharge Date: 10/18/2018     Instructions Given to Patient: Yes  Diet: Resume prior diet  Activity: activity as tolerated    Description of Condition on Discharge: Stable  Vital Signs (Most Recent): Temp: 97.5 °F (36.4 °C) (09/27/18 1330)  Pulse: 71 (09/27/18 1500)  Resp: 18 (09/27/18 1500)  BP: (!) 174/95 (09/27/18 1500)  SpO2: 100 % (09/27/18 1500)    Discharge Disposition: Home    Discharge Diagnosis: HCC     Follow-up: next few weeks for Y90 delivery    @SIG@

## 2018-10-18 NOTE — PROGRESS NOTES
Pt arrived to  for y-90.  Name verified using two identifiers.  Allergies verified. Consent obtained.  Will continue to monitor.

## 2018-10-18 NOTE — PROCEDURES
Radiology Post-Procedure Note    Pre Op Diagnosis: HCC  Post Op Diagnosis: Same    Procedure: Mapping angiogram for radioembolization    Procedure performed by: Bhavik Ramirez MD    Written Informed Consent Obtained: Yes  Specimen Removed: NO  Estimated Blood Loss: Minimal    Findings:   Successful Y90 mapping.    Patient tolerated procedure well.    @SIG@

## 2018-10-18 NOTE — PROGRESS NOTES
Discharge instructions reviewed with pt & wife, both verbalize understanding.  Instructions include medications, self/site care, and when to call a physician.  IV removed, DSD applied.  Pt wheeled to door, family has belongings.

## 2018-10-19 LAB — POCT GLUCOSE: 89 MG/DL (ref 70–110)

## 2018-11-02 DIAGNOSIS — C22.0 HCC (HEPATOCELLULAR CARCINOMA): Primary | ICD-10-CM

## 2019-01-21 ENCOUNTER — OFFICE VISIT (OUTPATIENT)
Dept: INTERVENTIONAL RADIOLOGY/VASCULAR | Facility: CLINIC | Age: 64
End: 2019-01-21
Payer: MEDICARE

## 2019-01-21 ENCOUNTER — HOSPITAL ENCOUNTER (OUTPATIENT)
Dept: RADIOLOGY | Facility: HOSPITAL | Age: 64
Discharge: HOME | End: 2019-01-21
Attending: FAMILY MEDICINE
Payer: MEDICARE

## 2019-01-21 ENCOUNTER — TELEPHONE (OUTPATIENT)
Dept: INTERVENTIONAL RADIOLOGY/VASCULAR | Facility: CLINIC | Age: 64
End: 2019-01-21

## 2019-01-21 VITALS
DIASTOLIC BLOOD PRESSURE: 74 MMHG | SYSTOLIC BLOOD PRESSURE: 138 MMHG | HEART RATE: 79 BPM | HEIGHT: 72 IN | WEIGHT: 180.31 LBS | BODY MASS INDEX: 24.42 KG/M2

## 2019-01-21 DIAGNOSIS — C22.0 HCC (HEPATOCELLULAR CARCINOMA): ICD-10-CM

## 2019-01-21 DIAGNOSIS — C22.0 HCC (HEPATOCELLULAR CARCINOMA): Primary | ICD-10-CM

## 2019-01-21 LAB
CREAT SERPL-MCNC: 0.9 MG/DL (ref 0.5–1.4)
SAMPLE: NORMAL

## 2019-01-21 PROCEDURE — 99212 PR OFFICE/OUTPT VISIT, EST, LEVL II, 10-19 MIN: ICD-10-PCS | Mod: S$PBB,,, | Performed by: FAMILY MEDICINE

## 2019-01-21 PROCEDURE — 25500020 PHARM REV CODE 255: Performed by: FAMILY MEDICINE

## 2019-01-21 PROCEDURE — 74183 MRI ABD W/O CNTR FLWD CNTR: CPT | Mod: TC

## 2019-01-21 PROCEDURE — 99999 PR PBB SHADOW E&M-EST. PATIENT-LVL II: CPT | Mod: PBBFAC,,,

## 2019-01-21 PROCEDURE — 99212 OFFICE O/P EST SF 10 MIN: CPT | Mod: S$PBB,,, | Performed by: FAMILY MEDICINE

## 2019-01-21 PROCEDURE — 74183 MRI ABDOMEN W WO CONTRAST: ICD-10-PCS | Mod: 26,,, | Performed by: RADIOLOGY

## 2019-01-21 PROCEDURE — 99999 PR PBB SHADOW E&M-EST. PATIENT-LVL II: ICD-10-PCS | Mod: PBBFAC,,,

## 2019-01-21 PROCEDURE — 74183 MRI ABD W/O CNTR FLWD CNTR: CPT | Mod: 26,,, | Performed by: RADIOLOGY

## 2019-01-21 PROCEDURE — 99212 OFFICE O/P EST SF 10 MIN: CPT | Mod: PBBFAC,25

## 2019-01-21 PROCEDURE — A9585 GADOBUTROL INJECTION: HCPCS | Performed by: FAMILY MEDICINE

## 2019-01-21 RX ORDER — GADOBUTROL 604.72 MG/ML
10 INJECTION INTRAVENOUS
Status: COMPLETED | OUTPATIENT
Start: 2019-01-21 | End: 2019-01-21

## 2019-01-21 RX ADMIN — GADOBUTROL 10 ML: 604.72 INJECTION INTRAVENOUS at 08:01

## 2019-01-21 NOTE — TELEPHONE ENCOUNTER
Spoke to patient. Notified of Dr. Ramirez's recommendation to repeat MRI in 3 months. Patient verbalized understanding and agreement.

## 2019-01-21 NOTE — PROGRESS NOTES
Subjective:       Patient ID: Brandon Syed is a 63 y.o. male.    Chief Complaint: Hepatocellular Carcinoma    Patient here for follow up of hepatocellular carcinoma recently treated with radioembolization on 10/18/2018. He reports tolerating the treatment well. He denies any abdominal pain or distention. He tells me he established care with an oncologist (Dr. Lora) close to home and was started on Lenvima. He had an MRI this morning. He is accompanied by his wife.       Review of Systems   Constitutional: Negative for activity change, appetite change, chills, fatigue and fever.   Respiratory: Negative for cough, shortness of breath, wheezing and stridor.    Cardiovascular: Negative for chest pain, palpitations and leg swelling.   Gastrointestinal: Negative for abdominal distention, abdominal pain, constipation, diarrhea, nausea and vomiting.       Objective:      Physical Exam   Constitutional: He is oriented to person, place, and time. He appears well-developed and well-nourished. No distress.   Cardiovascular: Normal rate, regular rhythm and normal heart sounds. Exam reveals no gallop and no friction rub.   No murmur heard.  Pulmonary/Chest: Effort normal and breath sounds normal. No stridor. No respiratory distress. He has no wheezes. He has no rales.   Abdominal: Soft. Bowel sounds are normal. He exhibits no distension and no mass. There is no tenderness. There is no guarding.   Neurological: He is alert and oriented to person, place, and time.   Skin: Skin is warm and dry. He is not diaphoretic.   Psychiatric: He has a normal mood and affect.   Vitals reviewed.      ECO  MELD-Na score: 6 at 10/18/2018  9:09 AM  MELD score: 6 at 10/18/2018  9:09 AM  Calculated from:  Serum Creatinine: 0.8 mg/dL (Rounded to 1 mg/dL) at 10/18/2018  9:09 AM  Serum Sodium: 140 mmol/L (Rounded to 137 mmol/L) at 10/18/2018  9:09 AM  Total Bilirubin: 0.4 mg/dL (Rounded to 1 mg/dL) at 10/18/2018  9:09 AM  INR(ratio): 0.9  (Rounded to 1) at 10/18/2018  9:09 AM  Age: 62 years  Transplant Status: not a candidate    Imaging:  Impression       Posttreatment changes of known HCC in hepatic segment VIII with nodular areas of peripheral enhancement at the hepatic dome potentially representing residual tumor.    No new hepatic lesions.      Assessment:       1. HCC (hepatocellular carcinoma)        Plan:         Reviewed MRI findings with patient. Explained there is an area of concern that may show residual tumor. Reassured patient I have messaged Dr. Ramirez and will call with his recommendations. Confirmed patient's phone number in [place]. Clinic phone number provided.

## 2019-05-24 ENCOUNTER — TELEPHONE (OUTPATIENT)
Dept: HEPATOLOGY | Facility: CLINIC | Age: 64
End: 2019-05-24

## 2019-05-24 NOTE — TELEPHONE ENCOUNTER
Patient's wife called to see Dr. Younger in clinic urgently.    I called and spoke with the patient. The patient sees Liset Lora MD, Oncology at [facility]. He had MRI 2 weeks ago.    I recommended to obtain records: blood tests, MRI report and MRI images on a disc from [facility] and review at our imaging conference.

## 2019-05-29 ENCOUNTER — TELEPHONE (OUTPATIENT)
Dept: HEPATOLOGY | Facility: CLINIC | Age: 64
End: 2019-05-29

## 2019-05-29 NOTE — TELEPHONE ENCOUNTER
Patient completed MRI 4/30/19, Labs 5/13/19, Needs AFP, F/U visit with Dr Younger scheduled for 6/18/19 at 11am.  Request for MRI on CD sent already to [facility] on 5/29/19

## 2019-05-29 NOTE — TELEPHONE ENCOUNTER
----- Message from Olga Basurto sent at 5/7/2019  9:13 PM CDT -----  Patient was due for MR and labs (+afp) in April.  Also needs f/u with [facility]    ----- Message -----  From: Diane Purvis NP  Sent: 4/23/2019  12:41 PM  To: [facility] Ir Conference    Good Afternoon,    Just making sure Mr. Syed is on your radar. He is due for MRI of the abdomen.    Thanks,  Diane

## 2019-05-31 ENCOUNTER — TELEPHONE (OUTPATIENT)
Dept: HEPATOLOGY | Facility: CLINIC | Age: 64
End: 2019-05-31

## 2019-05-31 NOTE — TELEPHONE ENCOUNTER
Received CD from [facility] via [employer] [id number].  MRI Abd dated 4/30/19 to [facility], given to Angelique to Upload for IR Conf.

## 2019-06-04 ENCOUNTER — TELEPHONE (OUTPATIENT)
Dept: TRANSPLANT | Facility: CLINIC | Age: 64
End: 2019-06-04

## 2019-06-04 NOTE — TELEPHONE ENCOUNTER
----- Message from Timurnohemy Laci sent at 6/4/2019 11:35 AM CDT -----  Regarding: RE: Hepatology Referral  Im send this to hepat. Dr. Phan requested they send these recs on the pt since he is covering for Dr. Younger. He waants for him to be added to IR. So, why she sent it like a new ref..........     It doesn't look like he has been added for IR.   ----- Message -----  From: Edna Nascimento  Sent: 6/4/2019   9:26 AM  To: [facility] Liver Referral Pool  Subject: Hepatology Referral                              Good morning, Dr. Liset Lora would like to refer the following patient to Dr. Phan in the hepatology department, but the patient is established with scheduled with Dr. Younger. The patients diagnosis is liver cell carcinoma. I have scanned the patients referral and records into [facility].     If there are any further questions in regards to the patient, please contact Dr. Lora's referral coordinator, Carol, at [phone].  Please let me know if I can help schedule in any way.  Thank you,   Edna  Ext. [phone]  [facility]

## 2019-06-07 ENCOUNTER — TELEPHONE (OUTPATIENT)
Dept: TRANSPLANT | Facility: CLINIC | Age: 64
End: 2019-06-07

## 2019-06-12 ENCOUNTER — TELEPHONE (OUTPATIENT)
Dept: HEPATOLOGY | Facility: CLINIC | Age: 64
End: 2019-06-12

## 2019-06-12 DIAGNOSIS — C22.0 HCC (HEPATOCELLULAR CARCINOMA): Primary | ICD-10-CM

## 2019-06-12 NOTE — TELEPHONE ENCOUNTER
Called patient. Spoke with is wife. We scheduled the Ct and labs before the appt with Dr. Younger.

## 2019-06-12 NOTE — TELEPHONE ENCOUNTER
----- Message from Ricardo Younger MD sent at 6/12/2019 10:11 AM CDT -----  This man is coming to see me on Tuesday June 18th.    Can he get labs and CT scans on same day?- orders entered.    He can have the scans before or after he seems me

## 2019-06-18 ENCOUNTER — TELEPHONE (OUTPATIENT)
Dept: HEPATOLOGY | Facility: CLINIC | Age: 64
End: 2019-06-18

## 2019-06-18 ENCOUNTER — OFFICE VISIT (OUTPATIENT)
Dept: HEPATOLOGY | Facility: CLINIC | Age: 64
End: 2019-06-18
Payer: MEDICARE

## 2019-06-18 ENCOUNTER — HOSPITAL ENCOUNTER (OUTPATIENT)
Dept: RADIOLOGY | Facility: HOSPITAL | Age: 64
Discharge: HOME | End: 2019-06-18
Attending: INTERNAL MEDICINE
Payer: MEDICARE

## 2019-06-18 VITALS
WEIGHT: 172.19 LBS | OXYGEN SATURATION: 95 % | DIASTOLIC BLOOD PRESSURE: 72 MMHG | BODY MASS INDEX: 23.32 KG/M2 | HEART RATE: 67 BPM | HEIGHT: 72 IN | SYSTOLIC BLOOD PRESSURE: 152 MMHG

## 2019-06-18 DIAGNOSIS — C22.0 HCC (HEPATOCELLULAR CARCINOMA): Primary | ICD-10-CM

## 2019-06-18 DIAGNOSIS — C22.0 HCC (HEPATOCELLULAR CARCINOMA): ICD-10-CM

## 2019-06-18 LAB
CREAT SERPL-MCNC: 0.9 MG/DL (ref 0.5–1.4)
SAMPLE: NORMAL

## 2019-06-18 PROCEDURE — 71260 CT THORAX DX C+: CPT | Mod: 26,,, | Performed by: RADIOLOGY

## 2019-06-18 PROCEDURE — 74177 CT ABD & PELVIS W/CONTRAST: CPT | Mod: TC

## 2019-06-18 PROCEDURE — 74177 CT CHEST ABDOMEN PELVIS WITH CONTRAST (XPD): ICD-10-PCS | Mod: 26,,, | Performed by: RADIOLOGY

## 2019-06-18 PROCEDURE — 74177 CT ABD & PELVIS W/CONTRAST: CPT | Mod: 26,,, | Performed by: RADIOLOGY

## 2019-06-18 PROCEDURE — 99213 OFFICE O/P EST LOW 20 MIN: CPT | Mod: PBBFAC,25 | Performed by: INTERNAL MEDICINE

## 2019-06-18 PROCEDURE — 99999 PR PBB SHADOW E&M-EST. PATIENT-LVL III: ICD-10-PCS | Mod: PBBFAC,,, | Performed by: INTERNAL MEDICINE

## 2019-06-18 PROCEDURE — 99214 OFFICE O/P EST MOD 30 MIN: CPT | Mod: S$PBB,,, | Performed by: INTERNAL MEDICINE

## 2019-06-18 PROCEDURE — 71260 CT CHEST ABDOMEN PELVIS WITH CONTRAST (XPD): ICD-10-PCS | Mod: 26,,, | Performed by: RADIOLOGY

## 2019-06-18 PROCEDURE — 99999 PR PBB SHADOW E&M-EST. PATIENT-LVL III: CPT | Mod: PBBFAC,,, | Performed by: INTERNAL MEDICINE

## 2019-06-18 PROCEDURE — 25500020 PHARM REV CODE 255: Performed by: INTERNAL MEDICINE

## 2019-06-18 PROCEDURE — 99214 PR OFFICE/OUTPT VISIT, EST, LEVL IV, 30-39 MIN: ICD-10-PCS | Mod: S$PBB,,, | Performed by: INTERNAL MEDICINE

## 2019-06-18 RX ORDER — DIAZEPAM 5 MG/1
5 TABLET ORAL 2 TIMES DAILY
COMMUNITY
Start: 2019-06-03

## 2019-06-18 RX ADMIN — IOHEXOL 100 ML: 350 INJECTION, SOLUTION INTRAVENOUS at 08:06

## 2019-06-18 NOTE — LETTER
June 19, 2019      Liset Lora MD  [address]  [address]  [facility] [address]           [facility] - Hepatology  [address]  [place] [address]  Phone: [phone]  Fax: [phone]          Patient: Brandon Syed   MR Number: [id number]   YOB: 1955   Date of Visit: 6/18/2019       Dear Dr. Liset Lora:    Thank you for referring Brandon Syed to me for evaluation. Attached you will find relevant portions of my assessment and plan of care.    If you have questions, please do not hesitate to call me. I look forward to following Brandon Syed along with you.    Sincerely,    Ricardo Younger MD    Enclosure  CC:  No Recipients    If you would like to receive this communication electronically, please contact [email or web address] or [phone] to request more information on [employer] access.    For providers and/or their staff who would like to refer a patient to [facility], please contact us through our one-stop-shop provider referral line, [facility], at [phone].    If you feel you have received this communication in error or would no longer like to receive these types of communications, please e-mail [email or web address]

## 2019-06-18 NOTE — TELEPHONE ENCOUNTER
Patient: Brandon Syed       MRN: [id number]      : 1955     Age: 63 y.o.  [address]  [place] [address]    Provider: Hepatologist Violette Younger    Urgency of review: urgent    Patient Transplant Status: Downstaging    Reason for presentation: Initial staging for transplant    Clinical Summary: 63 year old with 8.1cm HCC and possible tumor thrombus + AFP 24,000.  Y90 in Oct 2018 and now on lenvatinib    Well  AFP is now 3.1  ?Transplant eval?    Imaging to be reviewed: CT chest/abdo    HCC Treatment History: Y90 + lenvatinib    ABO:     Platelets:   Lab Results   Component Value Date/Time     (L) 2019 09:02 AM     Creatinine:   Lab Results   Component Value Date/Time    CREATININE 0.8 2019 09:02 AM    CREATININE 0.8 2019 09:02 AM     Bilirubin:   Lab Results   Component Value Date/Time    BILITOT 0.9 2019 09:02 AM     AFP Last 3 each if available:   Lab Results   Component Value Date/Time    AFP 1.3 2019 09:02 AM    AFP 79552 (H) 2018 05:00 PM       MELD: MELD-Na score: 6 at 2019  9:02 AM  MELD score: 6 at 2019  9:02 AM  Calculated from:  Serum Creatinine: 0.8 mg/dL (Rounded to 1 mg/dL) at 2019  9:02 AM  Serum Sodium: 136 mmol/L at 2019  9:02 AM  Total Bilirubin: 0.9 mg/dL (Rounded to 1 mg/dL) at 2019  9:02 AM  INR(ratio): 1.0 at 2019  9:02 AM  Age: 63 years    Plan:     Follow-up Provider:

## 2019-06-18 NOTE — PROGRESS NOTES
Subjective:       Patient ID: Brandon Syed is a 63 y.o. male.    Chief Complaint: HCC    HPI  I saw this 63 y.o. man who was first seen in Aug 2018 for evaluation of presumed HCC. I last saw him in Oct 2018 and he has since been followed by oncology in [place].    He has a h/o HCV cirrhosis, s/p treatment with IFN+RBV and victralis with SVR.     He has a h/o a subcentimeter liver mass, followed by serial imaging. In 02/2018  mass noted to be 24 mm and AFP was 13.8. Mass was biopsied no evidence of HCC; benign liver parenchyma with cirrhosis and moderate steatosis.    Pt reported severe abdominal pain starting 07/2018 accompanied by fatigue. He went to follow up and AFP repeated and had increased to 6360.      MRI 08/20/18 and a mass measuring 7.2x7.2x5.7 noted wih peripheral enhancement and pseudocapsule felt to be HCC. Furthermore, hepatic dome may be contiguous with the aforementioned mass and a second lesion may have coalesced with larger mass.       He has a h/o bladder carcinoma, noted 07/2017, s/p TURBT. Further PMH of HLD and DMII.     AFP 03048 on 8/29/2018  AFP 1.3 on 6/18/2019    IR conference review:  Pulmonary nodules on CT are non specific. Rec continued surveillance. Fleischer guidelines rec f/u in 6-12 months. MRI shows 8.1 cm heterogeneous mass in segments 5/9 and possibly extending to segment 7. Theres associated PVT. Appearance suggests infiltrative process which given AFP is favored to be HCC. Rec Y90.    Y90 on 10/18/18    Since then, he has followed with Dr Lora (Oncology, [facility])  - on lenvatinib    IR conference review: 6/11/2019  POSSIBLE RIGHT PV THROMBUS, NO ENHANCEMENT SEEN, NO DISEASE BURDEN SEEN ON IMAGING     CT chest/abdo: 6/`18/2019  1. In this patient with with a history of hepatocellular carcinoma, there is redemonstration of mass in hepatic segment VIII demonstrating appearance consistent with post treatment change.  No new hepatic lesions identified.  2.  Postsurgical changes aorto bi-iliac endovascular stent placement.  3. Splenomegaly.  4. Clustered micro nodules within the left upper lobe suggesting small airways or small vessels disease.  5. Additional findings as above.    PMH:  aortofemoral bypass- 2016    SH:  Ex smoker- stopped 2014 ( 40 year pack smoker)    Review of Systems   Constitutional: Negative for activity change, appetite change, chills, fatigue, fever and unexpected weight change.   HENT: Negative for hearing loss.    Eyes: Negative for discharge and visual disturbance.   Respiratory: Negative for cough, chest tightness, shortness of breath and wheezing.    Cardiovascular: Negative for chest pain, palpitations and leg swelling.   Gastrointestinal: Negative for abdominal distention, abdominal pain, constipation, diarrhea and nausea.   Genitourinary: Negative for dysuria and frequency.   Musculoskeletal: Negative for arthralgias and back pain.   Skin: Negative for pallor and rash.   Neurological: Negative for dizziness, tremors, speech difficulty and headaches.   Hematological: Negative for adenopathy.   Psychiatric/Behavioral: Negative for agitation and confusion.            Lab Results   Component Value Date    ALT 24 06/18/2019    AST 36 06/18/2019    ALKPHOS 156 (H) 06/18/2019    BILITOT 0.9 06/18/2019     Past Medical History:   Diagnosis Date    Arthritis     Bladder cancer 2017    s/p TURBT    Chronic lower back pain     Diabetes     Hx of hepatitis C     Hyperlipemia     Upper back pain      Past Surgical History:   Procedure Laterality Date    BLADDER SURGERY      TURBT    EMBOLIZATION, ARTERY, LIVER, FOR SELECTIVE INTERNAL RADIATION THERAPY USING YTTRIUM-90 MICROSPHERES N/A 9/27/2018    Performed by [facility] OR 2ND FLR    EMBOLIZATION, YTTRIUM THERAPY N/A 10/18/2018    Performed by [facility] OR 2ND FLR    FEMORAL BYPASS       Current Outpatient Medications   Medication Sig     DEXILANT 60 mg capsule Take 60 mg by mouth once daily.    diazePAM (VALIUM) 5 MG tablet Take 5 mg by mouth 2 (two) times daily.     lenvatinib (LENVIMA) 4 mg Cap Take by mouth once daily.    levothyroxine (SYNTHROID) 50 MCG tablet Take 50 mcg by mouth once daily.    lovastatin (MEVACOR) 20 MG tablet Take 20 mg by mouth once daily.    ondansetron (ZOFRAN-ODT) 4 MG TbDL Take 1 tablet (4 mg total) by mouth every 6 (six) hours as needed.    oxyCODONE-acetaminophen (PERCOCET) 5-325 mg per tablet Take 1 tablet by mouth every 6 (six) hours as needed for Pain.    metFORMIN (GLUCOPHAGE-XR) 500 MG 24 hr tablet Take 500 mg by mouth once daily.     No current facility-administered medications for this visit.      Facility-Administered Medications Ordered in Other Visits   Medication    0.9%  NaCl infusion    heparin infusion 1,000 units/500 ml in 0.9% NaCl (pressure line flush)       Objective:      Physical Exam   Constitutional: He is oriented to person, place, and time. He appears well-nourished.   HENT:   Head: Normocephalic.   Eyes: Pupils are equal, round, and reactive to light.   Neck: No thyromegaly present.   Cardiovascular: Normal rate, regular rhythm and normal heart sounds.   Pulmonary/Chest: Effort normal and breath sounds normal. He has no wheezes.   Abdominal: Soft. He exhibits no distension and no mass. There is no tenderness.       Lymphadenopathy:     He has no cervical adenopathy.   Neurological: He is alert and oriented to person, place, and time.   Skin: Skin is warm. No rash noted. No erythema.   Psychiatric: He has a normal mood and affect. His behavior is normal.       Assessment:       1. HCC (hepatocellular carcinoma)        Plan:   HCV cirrhosis with advanced HCC,  He has responded well to Y90 given in Oct 2018 and he has since tolerated a low dose of lenvatinib- 4mg daily.    His latest imaging appears not to show any tumor recurrence and I think it is reasonable to discuss him again in the  IR conference re his candidacy for LT.    He maintains an excellent functional status.  Clinic in 3 months with repeat tCT abdo.

## 2019-06-26 ENCOUNTER — TELEPHONE (OUTPATIENT)
Dept: TRANSPLANT | Facility: CLINIC | Age: 64
End: 2019-06-26

## 2019-06-26 NOTE — TELEPHONE ENCOUNTER
Hepatitis C (B18.2) and Hepatocellular Carcinoma (C22.0).  MELD 6.  Requesting authorization for liver transplant evaluation.

## 2019-07-16 ENCOUNTER — TELEPHONE (OUTPATIENT)
Dept: TRANSPLANT | Facility: CLINIC | Age: 64
End: 2019-07-16

## 2019-07-16 ENCOUNTER — TELEPHONE (OUTPATIENT)
Dept: TRANSPLANT | Facility: HOSPITAL | Age: 64
End: 2019-07-16

## 2019-07-16 DIAGNOSIS — Z86.19 HX OF HEPATITIS C: ICD-10-CM

## 2019-07-16 DIAGNOSIS — K74.69 OTHER CIRRHOSIS OF LIVER: ICD-10-CM

## 2019-07-16 DIAGNOSIS — Z11.4 ENCOUNTER FOR SCREENING FOR HIV: ICD-10-CM

## 2019-07-16 DIAGNOSIS — C67.9 MALIGNANT NEOPLASM OF URINARY BLADDER, UNSPECIFIED SITE: ICD-10-CM

## 2019-07-16 DIAGNOSIS — Z12.5 ENCOUNTER FOR SCREENING FOR MALIGNANT NEOPLASM OF PROSTATE: ICD-10-CM

## 2019-07-16 DIAGNOSIS — Z01.818 PRE-TRANSPLANT EVALUATION FOR LIVER TRANSPLANT: ICD-10-CM

## 2019-07-16 DIAGNOSIS — E11.8 TYPE 2 DIABETES MELLITUS WITH COMPLICATION, WITHOUT LONG-TERM CURRENT USE OF INSULIN: ICD-10-CM

## 2019-07-16 DIAGNOSIS — C22.0 HCC (HEPATOCELLULAR CARCINOMA): Primary | ICD-10-CM

## 2019-07-16 NOTE — TELEPHONE ENCOUNTER
Spoke with JAMES Syed in reference to coordinating dates for liver transplant evaluation.  Standard and Fast Pass evaluation procedures reviewed, as well as tentative start dates.  Patient expressed understanding and agreed to Fast Pass evaluation July 25th and 26th.  Tentative evaluation schedule reviewed.  Patient states recent egd and colonoscopy were done by Dr. Hernandez (will request results).  Advised of the need to fast after midnight July 24th and need to have caregiver attend.  All questions and concerns addressed.   Caregiver and Fast Pass appointment letters mailed.          Orders for liver transplant evaluation entered and chart given to  to schedule    MA messaged to request egd/colon reports

## 2019-07-17 ENCOUNTER — TELEPHONE (OUTPATIENT)
Dept: TRANSPLANT | Facility: CLINIC | Age: 64
End: 2019-07-17

## 2019-07-17 NOTE — TELEPHONE ENCOUNTER
Dr Terrazas office will send over edg and colon reports as well as path if done sent over request waiting on fax to arrive

## 2019-07-23 ENCOUNTER — TELEPHONE (OUTPATIENT)
Dept: TRANSPLANT | Facility: CLINIC | Age: 64
End: 2019-07-23

## 2019-07-23 NOTE — TELEPHONE ENCOUNTER
STEPHANIE returned call at phone number provided below and spoke with pt's wife Mendy Syed.  Mendy reports concern regarding making all of pt's appointments that have been scheduled for multiple days (7/25, 7/26, and 7/30) at [facility] main campus.  Mendy reports not having enough money for travel and lodging to make appointments on all 3 days.  STEPHANIE confirmed there still is no availability for [facility] for those days as previously requested last week.  STEPHANIE informed by Lamonte at [facility] (ph [phone]) that pt remains on wait list.  STEPHANIE informed Mendy of same.  STEPHANIE provided Mendy with [place] Medicaid transportation number ([phone]) to call to discuss possible transportation available via Medicaid coverage although pt only has Medicaid as supplement to Medicare.  STEPHANIE also encouraged Mendy to discuss with Medicaid  possible payment or reimbursement for gas as recommended on [employer] website.  STEPHANIE also encouraged Mendy to contact pt's transplant coordinator if she anticipates pt not being able to make any appointments.  Mendy expressed understanding regarding same and denies having any additional questions or concerns at this time.  SW to share the above information with pt's transplant coordinator Olga Basurto.  SW remains available for further support as needed.     ----- Message from Yashira Millan sent at 7/23/2019  2:37 PM CDT -----  Calling to find out if they can get lodging at [facility]    Pt contact [phone]

## 2019-07-25 ENCOUNTER — INITIAL CONSULT (OUTPATIENT)
Dept: TRANSPLANT | Facility: CLINIC | Age: 64
End: 2019-07-25
Payer: MEDICARE

## 2019-07-25 ENCOUNTER — CLINICAL SUPPORT (OUTPATIENT)
Dept: TRANSPLANT | Facility: CLINIC | Age: 64
End: 2019-07-25
Payer: MEDICARE

## 2019-07-25 ENCOUNTER — HOSPITAL ENCOUNTER (OUTPATIENT)
Dept: RADIOLOGY | Facility: HOSPITAL | Age: 64
Discharge: HOME | End: 2019-07-25
Attending: INTERNAL MEDICINE
Payer: MEDICARE

## 2019-07-25 ENCOUNTER — CLINICAL SUPPORT (OUTPATIENT)
Dept: INFECTIOUS DISEASES | Facility: CLINIC | Age: 64
End: 2019-07-25
Payer: MEDICARE

## 2019-07-25 VITALS
WEIGHT: 173.5 LBS | TEMPERATURE: 98 F | DIASTOLIC BLOOD PRESSURE: 78 MMHG | RESPIRATION RATE: 18 BRPM | DIASTOLIC BLOOD PRESSURE: 78 MMHG | TEMPERATURE: 98 F | OXYGEN SATURATION: 97 % | HEART RATE: 64 BPM | HEIGHT: 69 IN | RESPIRATION RATE: 18 BRPM | BODY MASS INDEX: 25.7 KG/M2 | HEART RATE: 64 BPM | BODY MASS INDEX: 23.53 KG/M2 | HEIGHT: 69 IN | WEIGHT: 173.5 LBS | SYSTOLIC BLOOD PRESSURE: 160 MMHG | DIASTOLIC BLOOD PRESSURE: 78 MMHG | OXYGEN SATURATION: 97 % | SYSTOLIC BLOOD PRESSURE: 160 MMHG | SYSTOLIC BLOOD PRESSURE: 160 MMHG | TEMPERATURE: 98 F | HEART RATE: 64 BPM | BODY MASS INDEX: 25.7 KG/M2 | WEIGHT: 173.5 LBS | OXYGEN SATURATION: 97 % | RESPIRATION RATE: 18 BRPM

## 2019-07-25 DIAGNOSIS — Z86.19 HX OF HEPATITIS C: ICD-10-CM

## 2019-07-25 DIAGNOSIS — C22.0 HCC (HEPATOCELLULAR CARCINOMA): ICD-10-CM

## 2019-07-25 DIAGNOSIS — Z01.818 PRE-TRANSPLANT EVALUATION FOR LIVER TRANSPLANT: ICD-10-CM

## 2019-07-25 DIAGNOSIS — E11.8 TYPE 2 DIABETES MELLITUS WITH COMPLICATION, WITHOUT LONG-TERM CURRENT USE OF INSULIN: ICD-10-CM

## 2019-07-25 DIAGNOSIS — C22.0 HCC (HEPATOCELLULAR CARCINOMA): Primary | ICD-10-CM

## 2019-07-25 LAB
AMPHET+METHAMPHET UR QL: NEGATIVE
BARBITURATES UR QL SCN>200 NG/ML: NEGATIVE
BENZODIAZ UR QL SCN>200 NG/ML: NORMAL
BILIRUB UR QL STRIP: NEGATIVE
BZE UR QL SCN: NEGATIVE
CANNABINOIDS UR QL SCN: NEGATIVE
CLARITY UR REFRACT.AUTO: CLEAR
COLOR UR AUTO: YELLOW
CREAT UR-MCNC: 101 MG/DL (ref 23–375)
ETHANOL UR-MCNC: <10 MG/DL
GLUCOSE UR QL STRIP: NEGATIVE
HGB UR QL STRIP: NEGATIVE
KETONES UR QL STRIP: NEGATIVE
LEUKOCYTE ESTERASE UR QL STRIP: NEGATIVE
METHADONE UR QL SCN>300 NG/ML: NEGATIVE
NITRITE UR QL STRIP: NEGATIVE
OPIATES UR QL SCN: NORMAL
PCP UR QL SCN>25 NG/ML: NEGATIVE
PH UR STRIP: 6 [PH] (ref 5–8)
PROT UR QL STRIP: NEGATIVE
SP GR UR STRIP: 1.01 (ref 1–1.03)
TOXICOLOGY INFORMATION: NORMAL
URN SPEC COLLECT METH UR: NORMAL

## 2019-07-25 PROCEDURE — 76700 US EXAM ABDOM COMPLETE: CPT | Mod: 26,TXP,, | Performed by: RADIOLOGY

## 2019-07-25 PROCEDURE — 71046 X-RAY EXAM CHEST 2 VIEWS: CPT | Mod: TC,FY,TXP

## 2019-07-25 PROCEDURE — 99213 OFFICE O/P EST LOW 20 MIN: CPT | Mod: PBBFAC,TXP,25

## 2019-07-25 PROCEDURE — 76700 US ABDOMEN COMP WITH DOPPLER_PRE LIVER TRANSPLANT: ICD-10-PCS | Mod: 26,TXP,, | Performed by: RADIOLOGY

## 2019-07-25 PROCEDURE — 93975 VASCULAR STUDY: CPT | Mod: 26,TXP,, | Performed by: RADIOLOGY

## 2019-07-25 PROCEDURE — 99204 PR OFFICE/OUTPT VISIT, NEW, LEVL IV, 45-59 MIN: ICD-10-PCS | Mod: S$PBB,TXP,, | Performed by: PHYSICIAN ASSISTANT

## 2019-07-25 PROCEDURE — G0009 ADMIN PNEUMOCOCCAL VACCINE: HCPCS | Mod: PBBFAC,TXP

## 2019-07-25 PROCEDURE — 97802 MEDICAL NUTRITION INDIV IN: CPT | Mod: PBBFAC,TXP | Performed by: DIETITIAN, REGISTERED

## 2019-07-25 PROCEDURE — 99212 OFFICE O/P EST SF 10 MIN: CPT | Mod: PBBFAC,25,27,TXP

## 2019-07-25 PROCEDURE — 99999 PR PBB SHADOW E&M-EST. PATIENT-LVL II: CPT | Mod: PBBFAC,TXP,,

## 2019-07-25 PROCEDURE — 99999 PR PBB SHADOW E&M-EST. PATIENT-LVL III: CPT | Mod: PBBFAC,TXP,, | Performed by: TRANSPLANT SURGERY

## 2019-07-25 PROCEDURE — 99205 OFFICE O/P NEW HI 60 MIN: CPT | Mod: S$PBB,TXP,, | Performed by: TRANSPLANT SURGERY

## 2019-07-25 PROCEDURE — 93975 VASCULAR STUDY: CPT | Mod: TC,TXP

## 2019-07-25 PROCEDURE — 99999 PR PBB SHADOW E&M-EST. PATIENT-LVL III: CPT | Mod: PBBFAC,TXP,,

## 2019-07-25 PROCEDURE — 99213 OFFICE O/P EST LOW 20 MIN: CPT | Mod: PBBFAC,25,27,TXP | Performed by: TRANSPLANT SURGERY

## 2019-07-25 PROCEDURE — 71046 XR CHEST PA AND LATERAL: ICD-10-PCS | Mod: 26,TXP,, | Performed by: RADIOLOGY

## 2019-07-25 PROCEDURE — G0010 ADMIN HEPATITIS B VACCINE: HCPCS | Mod: PBBFAC,TXP

## 2019-07-25 PROCEDURE — 99204 OFFICE O/P NEW MOD 45 MIN: CPT | Mod: S$PBB,TXP,, | Performed by: PHYSICIAN ASSISTANT

## 2019-07-25 PROCEDURE — 99999 PR PBB SHADOW E&M-EST. PATIENT-LVL III: ICD-10-PCS | Mod: PBBFAC,TXP,,

## 2019-07-25 PROCEDURE — 93975 US ABDOMEN COMP WITH DOPPLER_PRE LIVER TRANSPLANT: ICD-10-PCS | Mod: 26,TXP,, | Performed by: RADIOLOGY

## 2019-07-25 PROCEDURE — 99999 PR PBB SHADOW E&M-EST. PATIENT-LVL II: ICD-10-PCS | Mod: PBBFAC,TXP,,

## 2019-07-25 PROCEDURE — 71046 X-RAY EXAM CHEST 2 VIEWS: CPT | Mod: 26,TXP,, | Performed by: RADIOLOGY

## 2019-07-25 PROCEDURE — 99205 PR OFFICE/OUTPT VISIT, NEW, LEVL V, 60-74 MIN: ICD-10-PCS | Mod: S$PBB,TXP,, | Performed by: TRANSPLANT SURGERY

## 2019-07-25 PROCEDURE — 80307 DRUG TEST PRSMV CHEM ANLYZR: CPT | Mod: TXP

## 2019-07-25 PROCEDURE — 99999 PR PBB SHADOW E&M-EST. PATIENT-LVL III: ICD-10-PCS | Mod: PBBFAC,TXP,, | Performed by: TRANSPLANT SURGERY

## 2019-07-25 PROCEDURE — 81003 URINALYSIS AUTO W/O SCOPE: CPT | Mod: TXP

## 2019-07-25 RX ORDER — PROMETHAZINE HYDROCHLORIDE 25 MG/1
25 TABLET ORAL EVERY 6 HOURS PRN
COMMUNITY
Start: 2019-07-05

## 2019-07-25 RX ORDER — OXYCODONE HYDROCHLORIDE 20 MG/1
30 TABLET ORAL EVERY 4 HOURS PRN
COMMUNITY
Start: 2019-07-01

## 2019-07-25 NOTE — LETTER
July 29, 2019        Gurpreet Hernandez  [address] [facility]  [facility]  [place] [address]  Phone: [phone]  Fax: [phone]             [facility] Trans[facility]  [address]  [facility] [address]  Phone: [phone]   Patient: Brandon Syed   MR Number: [id number]   YOB: 1955   Date of Visit: 7/25/2019       Dear Dr. Gurpreet Hernandez    Thank you for referring Brandon Syed to me for evaluation. Attached you will find relevant portions of my assessment and plan of care.    If you have questions, please do not hesitate to call me. I look forward to following Brandon Syed along with you.    Sincerely,    Parth Cedeno Jr, MD    Enclosure    If you would like to receive this communication electronically, please contact [email or web address] or [phone] to request [employer] Link access.    [employer] Link is a tool which provides read-only access to select patient information with whom you have a relationship. Its easy to use and provides real time access to review your patients record including encounter summaries, notes, results, and demographic information.    If you feel you have received this communication in error or would no longer like to receive these types of communications, please e-mail [email or web address]
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
## 2019-07-25 NOTE — PROGRESS NOTES
Pre Transplant Infectious Diseases Consult  Liver Transplant Recipient Evaluation    Requesting Physician: Dr. Hernandez    Reason for Visit:  Pre Transplant Evaluation    Organ:  Liver    Etiology of Liver Disease:  HCC    History of Prior Transplant:  No    Currently taking immunosuppressants/steroids:  On Lenvatinib 1/day    History of Splenectomy:  No    Infectious History:  Current/recent infections or currently taking antibiotics?  No  History of recurrent infections (sinuses, throat, bladder/kidneys, intestines, skin, dental, lung, catheter (HD/PD) related, or peritonitis/SBP)?  No. Denies any infections due to immunosuppression.  Any major hospitalizations due to infection?  Yes - SSTI as a result of angiogram  If diabetic, history of diabetic foot infection/osteomyelitis?  Yes  History of shingles?  No  History of STDs (syphilis, viral hepatitis, HIV)?  No  Exposure to TB or ever had a positive TB skin test?  No  History of residence in coccidioides endemic areas ([facility]?  No  Any foreign travel?  No  Any associated illness?  No    Social/Environmental:  Occupational:  Farmer,   Animal exposures (dogs, cats, farm animals, bird cages, fish tanks):  Yes - 1 dog- fully immunized  Hobbies (gardening, hike, fish/hunting, etc): Fish, hunt  Consumption of raw/undercooked meat or seafood?  No  Any injectable or smoked recreational drug use?  No    Immunization History:  Childhood vaccines:  Yes  Last Flu shot: N  Tetanus/TDAP:Y  Hepatitis A:Y  Hepatitis B:Y  Prevnar-13:N  Pneumovax-23:N  Shingles (Zostavax/Shingrix):N  Meningococcal:  Other:     Serologies:  Hepatitis A Antibody IgG   Date Value Ref Range Status   07/25/2019 Positive (A)  Final     Hep B Core Total Ab   Date Value Ref Range Status   07/25/2019 Negative  Final     Hep B S Ab   Date Value Ref Range Status   07/25/2019 Negative  Final     Hepatitis B Surface Ag   Date Value Ref Range Status   07/25/2019 Negative  Final     HIV 1/2  Ag/Ab   Date Value Ref Range Status   07/25/2019 Negative Negative Final        Review of Systems   Constitution: Positive for malaise/fatigue. Negative for chills, decreased appetite, diaphoresis, fever, night sweats, weight gain and weight loss.   HENT: Negative for congestion, ear pain, hearing loss, sore throat, stridor and tinnitus.    Eyes: Negative for blurred vision, double vision and redness.   Cardiovascular: Negative for chest pain, leg swelling and palpitations.   Respiratory: Positive for shortness of breath. Negative for cough, hemoptysis, sputum production and wheezing.    Hematologic/Lymphatic: Negative for adenopathy. Does not bruise/bleed easily.   Skin: Negative for dry skin, itching, rash and suspicious lesions.   Musculoskeletal: Positive for back pain (chronic). Negative for arthritis, joint pain, joint swelling, myalgias and neck pain.   Gastrointestinal: Positive for abdominal pain. Negative for constipation, diarrhea, heartburn, nausea and vomiting.   Genitourinary: Negative for bladder incontinence, dysuria, flank pain, frequency, hesitancy, incomplete emptying and urgency.   Neurological: Negative for dizziness, focal weakness, headaches, loss of balance, numbness and weakness.   Psychiatric/Behavioral: Negative for depression and memory loss. The patient does not have insomnia and is not nervous/anxious.      Physical Exam   Constitutional: He is oriented to person, place, and time. He appears well-developed and well-nourished.   HENT:   Head: Normocephalic and atraumatic.   Mouth/Throat: Uvula is midline, oropharynx is clear and moist and mucous membranes are normal. He has dentures. No oral lesions. Normal dentition. No dental abscesses, lacerations or dental caries.   Eyes: Pupils are equal, round, and reactive to light. Conjunctivae and lids are normal. No scleral icterus.   Neck: Neck supple.   Cardiovascular: Normal rate and regular rhythm. Exam reveals no gallop and no friction  rub.   No murmur heard.  Pulmonary/Chest: Effort normal and breath sounds normal. No respiratory distress. He has no decreased breath sounds. He has no wheezes. He has no rhonchi. He has no rales.   Abdominal: Soft. Normal appearance, normal aorta and bowel sounds are normal. He exhibits distension. He exhibits no mass. There is no hepatosplenomegaly. There is tenderness (RUQ). There is no rebound and no guarding.   Musculoskeletal: He exhibits no edema.   Lymphadenopathy:        Head (right side): No submental, no submandibular, no tonsillar, no preauricular, no posterior auricular and no occipital adenopathy present.        Head (left side): No submental, no submandibular, no tonsillar, no preauricular, no posterior auricular and no occipital adenopathy present.     He has no cervical adenopathy.     He has no axillary adenopathy.        Right: No inguinal, no supraclavicular and no epitrochlear adenopathy present.        Left: No inguinal, no supraclavicular and no epitrochlear adenopathy present.   Neurological: He is alert and oriented to person, place, and time. No cranial nerve deficit.   Skin: Skin is warm, dry and intact. No lesion and no rash noted. He is not diaphoretic. No erythema. No pallor.   Psychiatric: He has a normal mood and affect. His behavior is normal.            Counseling:   I discussed with the patient the risk for increased susceptibility to infections following transplantation including increased risk for infection right after transplant and if rejection should occur.  The patient has been counseled on the importance of vaccinations including but not limited to a yearly flu vaccine. Patient was also instructed to encourage that family/caretakers receive their flu vaccine yearly. The patient was encouraged to contact us about any problems that may develop after immunizations and possible side effects were reviewed.     Specific guidance has been provided to the patient regarding the  patient's occupation, hobbies and activities to avoid future infectious complications. These include but are not limited to: avoiding raw/undercooked meats and seafood, avoiding unpasteurized milk/cheeses, proper (hand) hygiene, contact with animals and appropriate vaccination of animals, use of mosquito/tick precautions, avoiding walking barefoot, avoiding sick contacts, and seeking medical advice prior to foreign travel (specifically developing countries).     Transplant Candidacy: Based on available information, there are no identified significant barriers to transplantation from an infectious disease standpoint pending acceptable serologies and subject to recommendations below.     Final determination of transplant candidacy will be made once evaluation is complete and reviewed by the Transplant Selection Committee.  Quantiferon gold, HIV, strongyloides IgG and RPR pending. If positive, please refer to ID clinic.    ID recommendations:      - Influenza annually  -TDap rx given  - Prevnar 13 today followed by Pneumovax in 8 weeks rx given  - Hepatitis B vaccine (Heplisav) today and in one month rx given  -Shingrix Day 0 and 2 months.

## 2019-07-25 NOTE — PROGRESS NOTES
"TRANSPLANT NUTRITIONAL ASSESSMENT    Referring Provider: Ricardo Younger MD    Reason for Visit: Pre-liver transplant work-up    Age: 63 y.o.  Sex: male    Patient Active Problem List   Diagnosis    HCC (hepatocellular carcinoma)    Liver mass    Pre-transplant evaluation for liver transplant    Hx of hepatitis C    Diabetes    Bladder cancer     Past Medical History:   Diagnosis Date    Arthritis     Bladder cancer 2017    s/p TURBT    Chronic lower back pain     Diabetes     Hx of hepatitis C     Hyperlipemia     Upper back pain      Lab Results   Component Value Date     07/25/2019    K 3.8 07/25/2019    ALBUMIN 3.6 07/25/2019    HGBA1C 6.1 (H) 07/25/2019    CALCIUM 9.7 07/25/2019     Other Pertinent Labs: none    Current Outpatient Medications   Medication Sig    DEXILANT 60 mg capsule Take 60 mg by mouth once daily.    diazePAM (VALIUM) 5 MG tablet Take 5 mg by mouth 2 (two) times daily.     lenvatinib (LENVIMA) 4 mg Cap Take by mouth once daily.    levothyroxine (SYNTHROID) 50 MCG tablet Take 50 mcg by mouth once daily.    lovastatin (MEVACOR) 20 MG tablet Take 20 mg by mouth every evening.     metFORMIN (GLUCOPHAGE-XR) 500 MG 24 hr tablet Take 500 mg by mouth once daily.    oxyCODONE (ROXICODONE) 20 mg Tab immediate release tablet Take 20 mg by mouth every 4 (four) hours as needed.    promethazine (PHENERGAN) 25 MG tablet Take 25 mg by mouth every 6 (six) hours as needed.     No current facility-administered medications for this visit.      Facility-Administered Medications Ordered in Other Visits   Medication    0.9%  NaCl infusion    heparin infusion 1,000 units/500 ml in 0.9% NaCl (pressure line flush)     Allergies: Patient has no known allergies.    Ht Readings from Last 1 Encounters:   07/25/19 5' 8.98" (1.752 m)     Wt Readings from Last 1 Encounters:   07/25/19 78.7 kg (173 lb 8 oz)      BMI: Body mass index is 25.64 kg/m².    Usual Weight: 170-175 lb  Weight " "Change/Time: none significant  Current Diet: regular  Appetite/Current Intake: good   Exercise/Physical Activity: fully functional in ADL's, no exercise, no limitations stated, used to drive trucks  Nutritional/Herbal Supplements: none  Potential Food/Medication Interactions: reviewed  Chewing/Swallowing Problems: none stated though many teeth extracted  Symptoms: nausea  Assessment of Lab Values: Ha1c 6.1  Support System: spouse present and voices support in nutrition/diet, cooks meals for pt    Estimated Kcal Need: 3344-3342 kcal (25-30 kcal/kg)  Estimated Protein Need:  gm (1-1.5 gm/kg)  Nutritional History:   Pt reports having a good appetite, occasional nausea. Pt does not eat fried foods or much bread at all any more. Pt likes potatoes, mainly sweet potatoes. Pt takes metformin "as needed". Pt reported the following diet recall:  B: cereal ([place] toast crunch/Cocoa puffs/corn flakes) & whole milk or flavored oatmeal  L: sandwich or just lunch meat; turkey/ham/chicken, occasional cheese, packaged cheese & crackers/peanut butter crackers, fruit  Beverages: water & sugar free flavoring  D: mainly at home; pork chops / chicken/ fish/ meat loaf/ potatoes/ green beans/sweet peas/ carrots/ roast & onions in crock pot, broccoli & cheese  Snack: microwave popcorn or cereal    Nutritional Diagnoses  Problem: food- and nutrition-related knowledge deficit  Etiology: r/t no prior edu on low sodium diet, adequate protein intake  Symptoms: aeb diet recall    Educational Need? yes  Barriers: none identified  Discussed with: patient and spouse  Interventions: Patient taught nutrition information regarding Pre-liver transplant work-up.    Reviewed Low Sodium packet (low Na diet, foods recommended/not recommended, food label strategies, flavoring tips, & sample menu).   Provided education on protein content in foods, goal intake per day, suggestions for ways to reach protein intake goal; nutrition supplements, snacks. "   Stay physically active, walking regularly.   Goals/Recommendations: diet adherence  Actions Taken: instruct/provide written information  Strategies Used: problem solving, goal setting, motivational interviewing  Patient and/or family comprehend instructions: yes , adherence expected  Outcome: Verbalizes understanding  Monitoring:     Contact information provided, will f/u in clinic and communicate with the care team as needed.     Counseling Time: 15 minutes

## 2019-07-25 NOTE — PROGRESS NOTES
PHARM.D. PRE-TRANSPLANT NOTE:    This patient's medication therapy was evaluated as part of his pre-transplant evaluation.      The following general pharmacologic concerns were noted: none    The following pharmacologic concerns related to HCV therapy were noted: none      This patient's medication profile was reviewed for contraindications for DAA Hepatitis C therapy:    [x]  No current inducers of CYP 3A4 or PGP  [x]  No amiodarone on this patient's EMR profile in the last 24 months  [x]  No past or current atrial fibrillation on this patient's EMR profile       Current Outpatient Medications   Medication Sig Dispense Refill    DEXILANT 60 mg capsule Take 60 mg by mouth once daily.      diazePAM (VALIUM) 5 MG tablet Take 5 mg by mouth 2 (two) times daily.       lenvatinib (LENVIMA) 4 mg Cap Take by mouth once daily.      levothyroxine (SYNTHROID) 50 MCG tablet Take 50 mcg by mouth once daily.      lovastatin (MEVACOR) 20 MG tablet Take 20 mg by mouth every evening.       metFORMIN (GLUCOPHAGE-XR) 500 MG 24 hr tablet Take 500 mg by mouth once daily.      oxyCODONE (ROXICODONE) 20 mg Tab immediate release tablet Take 20 mg by mouth every 4 (four) hours as needed.      promethazine (PHENERGAN) 25 MG tablet Take 25 mg by mouth every 6 (six) hours as needed.       No current facility-administered medications for this visit.      Facility-Administered Medications Ordered in Other Visits   Medication Dose Route Frequency Provider Last Rate Last Dose    0.9%  NaCl infusion   Intravenous Continuous Lis Sanabria DNP 75 mL/hr at 09/27/18 1115      heparin infusion 1,000 units/500 ml in 0.9% NaCl (pressure line flush)  500 mL Intravenous Continuous Lis Sanabria DNP             Currently he is responsible for preparing / administering this patient's medications on a daily basis.  I am available for consultation and can be contacted, as needed by the other members of the Liver Transplant team.

## 2019-07-25 NOTE — PROGRESS NOTES
Patient seen in clinic with caregiver.   Patient consents reviewed and signatures obtained.  Patient appointments reviewed along with special instructions and locations.  Patient questions answered and concerns addressed,

## 2019-07-25 NOTE — PROGRESS NOTES
PRE EDUCATION TEACHING NOTE    Brandon Syed was seen in clinic today.  Handbook on pre-liver transplant information (see outline below) was given to the patient and time was allowed for questions.  Patient's wife, Mendy accompanied him.  Informed consent signed and written information given on selection criteria.    LIVER TRANSPLANT WORK-UP EDUCATION   I. UNDERSTANDING THE TRANSPLANT PROCESS     A. Transplant team      B. MELD score      C. Balancing urgency and outcome     D. Liver Transplant Options         1.  Donor         2. Living Donor--rationale, benefits     E. Transplant Work-up         1. Medical         2. Psychological and Social--lifetime commitment, life changes, personal plan/ goal         3. Financial--fundraising     F.  Completed work-up and Next Steps    G. Wait Time         1.  Can be listed at more than one center         2.  Can transfer wait time     H. The Call       I. Possible donor options         1. DCD         2. Hep B Core and Hep C Positive         3. Increased Risk     J.  Liver Transplant Surgery         1. Length         2. Transfusions, cell saver         3. Surgical risks         4. What to expect after sugery--Central lines, drains, Davis catheter, incision, endotracheal              tube, NG tube, length of stay in ICU/ TSU  II.  HOW TO BEST CARE FOR YOURSELF (Take Five To Thrive)  III. UNDERSTANDING LIFE AFTER TRANSPLANT  A. Medicines after transplant      1. Immunosuppression--infection and rejection  B. Labs   IV. ADULT LIVER SURVIVAL RATES

## 2019-07-25 NOTE — PROGRESS NOTES
"Transplant Recipient Adult Psychosocial Assessment    Brandon Syed   [place] [address]  Telephone Information:   Mobile [phone]   Home  [phone] (home)  Work  There is no work phone number on file.  E-mail  No e-mail address on record    Sex: male  YOB: 1955  Age: 63 y.o.    Encounter Date: 2019  [place] Citizen: yes  Primary Language: English   Needed: no    Emergency Contact:  Name: Mendy Villasenor  Relationship: wife  Address:  [place] [address]  Phone Numbers:  [phone] (home),  [phone] (mobile)    Family/Social Support:   Number of dependents/: none  Marital history: Patient is currently  and has been  for 40 years  Other family dynamics: Patient has one sister and a  brother. Patient reported having three children. Patient reported that his father is not doing well due to colon cancer and his mother is like a "spring chicken." Patient has a pet that will be cared for by family during transplant.    Household Composition:  Patient, spouse, and son (Brandon Syed) live in home together.    Do you and your caregivers have access to reliable transportation? yes  PRIMARY CAREGIVER: Mendy Syed will be primary caregiver, phone number [phone].      provided in-depth information to patient and caregiver regarding pre- and post-transplant caregiver role.   strongly encourages patient and caregiver to have concrete plan regarding post-transplant care giving, including back-up caregiver(s) to ensure care giving needs are met as needed.    Patient and Caregiver states understanding all aspects of caregiver role/commitment and is able/willing/committed to being caregiver to the fullest extent necessary.    Patient and Caregiver verbalizes understanding of the education provided today and caregiver responsibilities.         remains available. Patient and Caregiver " agree to contact  in a timely manner if concerns arise.      Able to take time off work without financial concerns: yes.  Caregiver currently does not work and was on disability; however, lost disability once spouse obtain disability.    Additional Significant Others who will Assist with Transplant:  Name: Brandon Syed ph# [phone]  Age: 21  City: [place] State: [place]  Relationship: son  Does person drive? yes   Caregiver is currently not working. Caregiver confirmed    Name: Janette Machuca ph#[phone]  Age: 34  City: [place] State: [place]  Relationship: daughter  Does person drive? yes   Caregiver does not currently work. Has two children that she is caring for. Pt also is in remission from Chron's    Living Will: no  Healthcare Power of : no  Advance Directives on file: <<no information> per medical record.  Verbally reviewed LW/HCPA information.   provided patient with copy of LW/HCPA documents and provided education on completion of forms.    Living Donors: No.    Highest Education Level: High School (9-12) or GED  Reading Ability: 12th grade  Reports difficulty with: seeing; wears prescription. Patient also has difficulty hearing. Will be getting his hearing aide checked.   Learns Best By:  Written, verbal, and demonstration      Status: no  VA Benefits: no     Working for Income: No  If no, reason not working: Disability  Patient is employed as  for about 40 years..    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    Monthly Income:   Disability: $1182  Able to afford all costs now and if transplanted, including medications: yes  Patient and Caregiver verbalizes understanding of personal responsibilities related to transplant costs and the importance of having a financial plan to ensure that patients transplant costs are fully covered.      provided fundraising  information/education.  Patient and Caregiver verbalizes understanding.   remains available.    Insurance:   Payor/Plan Subscr  Sex Relation Sub. Ins. ID Effective Group Num   1. MEDICARE - [place]NE D[place]KEY 1955 Male  [id number] 10/1/11                                    [address]   2. [facility] 1955 Male Self [id number]                                    [address] [address]     Primary Insurance (for UNOS reporting): Public Insurance - Medicare FFS (Fee For Service)  Secondary Insurance (for UNOS reporting): Public Insurance - Medicaid  Patient and Caregiver verbalizes clear understanding that patient may experience difficulty obtaining and/or be denied insurance coverage post-surgery. This includes and is not limited to disability insurance, life insurance, health insurance, burial insurance, long term care insurance, and other insurances.    Patient and Caregiver also reports understanding that future health concerns related to or unrelated to transplantation may not be covered by patient's insurance.  Resources and information provided and reviewed.      Patient and Caregiver provides verbal permission to release any necessary information to outside resources for patient care and discharge planning.  Resources and information provided are reviewed.      Dialysis Adherence:  Patient and Caregiver reports that patient does not require dialysis.     Infusion Service: patient utilizing? no  Home Health: patient utilizing? yes, [facility] for skilled nursing in the past for wound care.   DME: no; crutches, walker, cane, and bsc from previous surgery  Pulmonary/Cardiac Rehab: none  ADLS:  Patient reported that he is still currently driving. Reported that he cannot ambulate long distances, but reported that he can walk about a block.     Adherence:  Patient and caregiver reported that patient has been compliant with medical recommendations and regimens.   Adherence education and counseling provided.     Per History Section:  Past Medical History:   Diagnosis Date    Arthritis     Bladder cancer 2017    s/p TURBT    Chronic lower back pain     Diabetes     Hx of hepatitis C     Hyperlipemia     Upper back pain      Social History     Tobacco Use    Smoking status: Former Smoker     Last attempt to quit:      Years since quittin.5    Smokeless tobacco: Never Used   Substance Use Topics    Alcohol use: No     Frequency: Never     Social History     Substance and Sexual Activity   Drug Use No     Social History     Substance and Sexual Activity   Sexual Activity Not on file       Per Today's Psychosocial:  Tobacco: none, patient denies any use. Patient reported that he stopped smoking about five years ago.  Patient stated that he smoked about a ppd for about 30-35 years.  Alcohol: none, patient denies any use. Patient reported that he quit drinking around  due to falling off an 18 alvarez and stated that he didn't want to drink after that. He also reported his rose mary caused him to change his lifestyle. Prior to  patient reported drinking heavily on the weekends.   Illicit Drugs/Non-prescribed Medications: none, patient denies any use.    Patient and Caregiver states clear understanding of the potential impact of substance use as it relates to transplant candidacy and is aware of possible random substance screening.  Substance abstinence/cessation counseling, education and resources provided and reviewed.     Arrests/DWI/Treatment/Rehab: patient denies    Psychiatric History:    Mental Health: pt denies  Psychiatrist/Counselor: none  Medications:  none  Suicide/Homicide Issues: patient denied past or current SI/HI   Safety at home: patient reported living in a safe and appropriate environment.    Knowledge: Patient and Caregiver states having clear understanding and realistic expectations regarding the potential risks and potential benefits of  organ transplantation and organ donation, agrees to discuss with health care team members and support system members and to utilize available resources and express questions and/or concerns in order to further facilitate the pt informed decision-making.  Resources and information provided and reviewed.     Patient and Caregiver is aware of [facility]'s affiliation and/or partnership with agencies in home health care, LTAC, SNF, [facility], and other hospitals and clinics.    Understanding: Patient and Caregiver reports having a clear understanding of the many lifetime commitments involved with being a transplant recipient, including costs, compliance, medications, lab work, procedures, appointments, concrete and financial planning, preparedness, timely and appropriate communication of concerns, abstinence (ETOH, tobacco, illicit non-prescribed drugs), adherence to all health care team recommendations, support system and caregiver involvement, appropriate and timely resource utilization and follow-through, mental health counseling as needed/recommended, and patient and caregiver responsibilities.  Social Service Handbook, resources and detailed educational information provided and reviewed.  Educational information provided.    Patient and Caregiver also reports current and expected compliance with health care regime and states having a clear understanding of the importance of compliance.      Patient and Caregiver reports a clear understanding that risks and benefits may be involved with organ transplantation and with organ donation.      Patient and Caregiver also reports clear understanding that psychosocial risk factors may affect patient, and include but are not limited to feelings of depression, generalized anxiety, anxiety regarding dependence on others, post traumatic stress disorder, feelings of guilt and other emotional and/or mental concerns, and/or exacerbation of existing mental health concerns.  Detailed  resources provided and discussed.     Patient and Caregiver agrees to access appropriate resources in a timely manner as needed and/or as recommended, and to communicate concerns appropriately.  Patient and Caregiver also reports a clear understanding of treatment options available.      reviewed education, provided additional information, and answered questions.    Feelings or Concerns: pt denied having any feeling or concerns related to transplant.    Coping: Patient reported adequate coping with the support of family and friends.    Goals: Patient reported he would like to go back to work if possible and be present for his grandchildren.  Patient referred to Vocational Rehabilitation.    Interview Behavior: Patient and Caregiver presents as alert and oriented x 4, pleasant, good eye contact, well groomed, recall good, concentration/judgement good, average intelligence, calm, communicative, cooperative and asking and answering questions appropriately.          Transplant Social Work - Candidacy  Assessment/Plan:     Psychosocial Suitability: Patient presents as a marginal candidate for liver transplant at this time. Based on psychosocial risk factors, patient presents as medium risk, due to limited income and resources.Patient reported that his disability is $1,182 a month. Patient and spouse stated that they continue to experience some financial hardship and their son assist them when possible. Patient provided information on how to contact [employer] regarding gas cards, and a list of handwritten food zaragoza were also provided to patient. Protective factors include; adequate and suitable caregiver support and adequate insurance coverage. Patient denied having any current substance abuse.     Recommendations/Additional Comments:   -fundraising  -local lodging     Ishan Harding LMSW

## 2019-07-26 ENCOUNTER — HOSPITAL ENCOUNTER (OUTPATIENT)
Dept: RADIOLOGY | Facility: CLINIC | Age: 64
Discharge: HOME | End: 2019-07-26
Attending: INTERNAL MEDICINE
Payer: MEDICARE

## 2019-07-26 ENCOUNTER — HOSPITAL ENCOUNTER (OUTPATIENT)
Dept: CARDIOLOGY | Facility: CLINIC | Age: 64
Discharge: HOME | End: 2019-07-26
Attending: INTERNAL MEDICINE
Payer: MEDICARE

## 2019-07-26 VITALS
SYSTOLIC BLOOD PRESSURE: 148 MMHG | WEIGHT: 173 LBS | RESPIRATION RATE: 16 BRPM | DIASTOLIC BLOOD PRESSURE: 68 MMHG | BODY MASS INDEX: 23.43 KG/M2 | HEIGHT: 72 IN

## 2019-07-26 DIAGNOSIS — Z86.19 HX OF HEPATITIS C: ICD-10-CM

## 2019-07-26 DIAGNOSIS — C22.0 HCC (HEPATOCELLULAR CARCINOMA): ICD-10-CM

## 2019-07-26 DIAGNOSIS — Z01.818 PRE-TRANSPLANT EVALUATION FOR LIVER TRANSPLANT: ICD-10-CM

## 2019-07-26 DIAGNOSIS — E11.8 TYPE 2 DIABETES MELLITUS WITH COMPLICATION, WITHOUT LONG-TERM CURRENT USE OF INSULIN: ICD-10-CM

## 2019-07-26 LAB
ASCENDING AORTA: 2.75 CM
AV INDEX (PROSTH): 0.96
AV MEAN GRADIENT: 1 MMHG
AV PEAK GRADIENT: 2 MMHG
AV VALVE AREA: 3.21 CM2
AV VELOCITY RATIO: 0.9
BSA FOR ECHO PROCEDURE: 2 M2
CV ECHO LV RWT: 0.37 CM
CV STRESS BASE HR: 53 BPM
DIASTOLIC BLOOD PRESSURE: 73 MMHG
DOP CALC AO PEAK VEL: 0.79 M/S
DOP CALC AO VTI: 16.33 CM
DOP CALC LVOT AREA: 3.3 CM2
DOP CALC LVOT DIAMETER: 2.06 CM
DOP CALC LVOT PEAK VEL: 0.71 M/S
DOP CALC LVOT STROKE VOLUME: 52.37 CM3
DOP CALCLVOT PEAK VEL VTI: 15.72 CM
E WAVE DECELERATION TIME: 244.58 MSEC
E/A RATIO: 1.07
E/E' RATIO: 12 M/S
ECHO LV POSTERIOR WALL: 0.79 CM (ref 0.6–1.1)
FRACTIONAL SHORTENING: 37 % (ref 28–44)
INTERVENTRICULAR SEPTUM: 0.73 CM (ref 0.6–1.1)
IVRT: 0.11 MSEC
LA MAJOR: 4.56 CM
LA MINOR: 4.72 CM
LA WIDTH: 3.12 CM
LEFT ATRIUM SIZE: 3.58 CM
LEFT ATRIUM VOLUME INDEX: 22 ML/M2
LEFT ATRIUM VOLUME: 44.04 CM3
LEFT INTERNAL DIMENSION IN SYSTOLE: 2.7 CM (ref 2.1–4)
LEFT VENTRICLE DIASTOLIC VOLUME INDEX: 41.03 ML/M2
LEFT VENTRICLE DIASTOLIC VOLUME: 82.19 ML
LEFT VENTRICLE MASS INDEX: 49 G/M2
LEFT VENTRICLE SYSTOLIC VOLUME INDEX: 13.5 ML/M2
LEFT VENTRICLE SYSTOLIC VOLUME: 27.1 ML
LEFT VENTRICULAR INTERNAL DIMENSION IN DIASTOLE: 4.28 CM (ref 3.5–6)
LEFT VENTRICULAR MASS: 97.7 G
LV LATERAL E/E' RATIO: 11.14 M/S
LV SEPTAL E/E' RATIO: 13 M/S
MV PEAK A VEL: 0.73 M/S
MV PEAK E VEL: 0.78 M/S
OHS CV CPX 1 MINUTE RECOVERY HEART RATE: 134 BPM
OHS CV CPX 85 PERCENT MAX PREDICTED HEART RATE MALE: 135
OHS CV CPX MAX PREDICTED HEART RATE: 159
OHS CV CPX PATIENT IS FEMALE: 0
OHS CV CPX PATIENT IS MALE: 1
OHS CV CPX PEAK DIASTOLIC BLOOD PRESSURE: 94 MMHG
OHS CV CPX PEAK HEAR RATE: 141 BPM
OHS CV CPX PEAK RATE PRESSURE PRODUCT: NORMAL
OHS CV CPX PEAK SYSTOLIC BLOOD PRESSURE: 205 MMHG
OHS CV CPX PERCENT MAX PREDICTED HEART RATE ACHIEVED: 89
OHS CV CPX RATE PRESSURE PRODUCT PRESENTING: 8745
PISA TR MAX VEL: 2.43 M/S
PULM VEIN S/D RATIO: 1.29
PV PEAK D VEL: 0.38 M/S
PV PEAK S VEL: 0.49 M/S
RA MAJOR: 4.25 CM
RA PRESSURE: 3 MMHG
RA WIDTH: 2.94 CM
RIGHT VENTRICULAR END-DIASTOLIC DIMENSION: 3.42 CM
RV TISSUE DOPPLER FREE WALL SYSTOLIC VELOCITY 1 (APICAL 4 CHAMBER VIEW): 10.19 CM/S
SINUS: 3.13 CM
STJ: 2.62 CM
STRESS ST DEPRESSION: 0.5 MM
SYSTOLIC BLOOD PRESSURE: 165 MMHG
TDI LATERAL: 0.07 M/S
TDI SEPTAL: 0.06 M/S
TDI: 0.07 M/S
TR MAX PG: 24 MMHG
TRICUSPID ANNULAR PLANE SYSTOLIC EXCURSION: 2.69 CM
TV REST PULMONARY ARTERY PRESSURE: 27 MMHG

## 2019-07-26 PROCEDURE — 93325 DOPPLER ECHO COLOR FLOW MAPG: CPT | Mod: PBBFAC,TXP | Performed by: INTERNAL MEDICINE

## 2019-07-26 PROCEDURE — 93351 ECHOCARDIOGRAM STRESS TEST WITH COLOR FLOW DOPPLER (CUPID ONLY): ICD-10-PCS | Mod: 26,S$PBB,TXP, | Performed by: INTERNAL MEDICINE

## 2019-07-26 PROCEDURE — 93351 STRESS TTE COMPLETE: CPT | Mod: 26,S$PBB,TXP, | Performed by: INTERNAL MEDICINE

## 2019-07-26 PROCEDURE — 77080 DXA BONE DENSITY AXIAL: CPT | Mod: 26,TXP,, | Performed by: INTERNAL MEDICINE

## 2019-07-26 PROCEDURE — 93320 ECHOCARDIOGRAM STRESS TEST WITH COLOR FLOW DOPPLER (CUPID ONLY): ICD-10-PCS | Mod: 26,S$PBB,TXP, | Performed by: INTERNAL MEDICINE

## 2019-07-26 PROCEDURE — 77080 DXA BONE DENSITY AXIAL: CPT | Mod: TC,TXP

## 2019-07-26 PROCEDURE — 96372 THER/PROPH/DIAG INJ SC/IM: CPT | Mod: PBBFAC,TXP

## 2019-07-26 PROCEDURE — 77080 DEXA BONE DENSITY SPINE HIP: ICD-10-PCS | Mod: 26,TXP,, | Performed by: INTERNAL MEDICINE

## 2019-07-26 PROCEDURE — 93325 ECHOCARDIOGRAM STRESS TEST WITH COLOR FLOW DOPPLER (CUPID ONLY): ICD-10-PCS | Mod: 26,S$PBB,TXP, | Performed by: INTERNAL MEDICINE

## 2019-07-26 PROCEDURE — 93320 DOPPLER ECHO COMPLETE: CPT | Mod: 26,S$PBB,TXP, | Performed by: INTERNAL MEDICINE

## 2019-07-26 RX ORDER — ATROPINE SULFATE 0.1 MG/ML
1 INJECTION INTRAVENOUS
Status: COMPLETED | OUTPATIENT
Start: 2019-07-26 | End: 2019-07-26

## 2019-07-26 RX ORDER — DOBUTAMINE HYDROCHLORIDE 200 MG/100ML
10 INJECTION INTRAVENOUS
Status: COMPLETED | OUTPATIENT
Start: 2019-07-26 | End: 2019-07-26

## 2019-07-26 RX ADMIN — DOBUTAMINE HYDROCHLORIDE 10 MCG/KG/MIN: 200 INJECTION INTRAVENOUS at 11:07

## 2019-07-26 RX ADMIN — ATROPINE SULFATE 1 MG: 0.1 INJECTION PARENTERAL at 11:07

## 2019-07-30 ENCOUNTER — OFFICE VISIT (OUTPATIENT)
Dept: UROLOGY | Facility: CLINIC | Age: 64
End: 2019-07-30
Payer: MEDICARE

## 2019-07-30 VITALS
BODY MASS INDEX: 23.84 KG/M2 | HEIGHT: 72 IN | WEIGHT: 176 LBS | DIASTOLIC BLOOD PRESSURE: 67 MMHG | SYSTOLIC BLOOD PRESSURE: 163 MMHG | HEART RATE: 70 BPM

## 2019-07-30 DIAGNOSIS — C67.9 MALIGNANT NEOPLASM OF URINARY BLADDER, UNSPECIFIED SITE: Primary | ICD-10-CM

## 2019-07-30 PROCEDURE — 99203 PR OFFICE/OUTPT VISIT, NEW, LEVL III, 30-44 MIN: ICD-10-PCS | Mod: S$PBB,TXP,, | Performed by: UROLOGY

## 2019-07-30 PROCEDURE — 99999 PR PBB SHADOW E&M-EST. PATIENT-LVL III: ICD-10-PCS | Mod: PBBFAC,TXP,, | Performed by: UROLOGY

## 2019-07-30 PROCEDURE — 99999 PR PBB SHADOW E&M-EST. PATIENT-LVL III: CPT | Mod: PBBFAC,TXP,, | Performed by: UROLOGY

## 2019-07-30 PROCEDURE — 99213 OFFICE O/P EST LOW 20 MIN: CPT | Mod: PBBFAC,TXP | Performed by: UROLOGY

## 2019-07-30 PROCEDURE — 99203 OFFICE O/P NEW LOW 30 MIN: CPT | Mod: S$PBB,TXP,, | Performed by: UROLOGY

## 2019-07-30 RX ORDER — LEVOTHYROXINE SODIUM 75 UG/1
TABLET ORAL
COMMUNITY
Start: 2019-07-08 | End: 2020-12-14 | Stop reason: CLARIF

## 2019-07-30 NOTE — LETTER
July 30, 2019      Ricardo Younger MD  [address]  [facility] [address]           [facility]  [address]  [facility] [address]  Phone: [phone]          Patient: Brandon Syed   MR Number: [id number]   YOB: 1955   Date of Visit: 7/30/2019       Dear Dr. Ricardo Younger:    Thank you for referring Brandon Syed to me for evaluation. Attached you will find relevant portions of my assessment and plan of care.    If you have questions, please do not hesitate to call me. I look forward to following Brandon Syed along with you.    Sincerely,    Preston Bernstein MD    Enclosure  CC:  No Recipients    If you would like to receive this communication electronically, please contact [email or web address] or [phone] to request more information on [facility] Link access.    For providers and/or their staff who would like to refer a patient to [facility], please contact us through our one-stop-shop provider referral line, [facility], at [phone].    If you feel you have received this communication in error or would no longer like to receive these types of communications, please e-mail [email or web address]

## 2019-07-30 NOTE — PROGRESS NOTES
Subjective:       Patient ID: Brandon Syed is a 63 y.o. male.    Chief Complaint:  Bladder cancer    History of Present Illness  HPI   Patient is a 63 y.o. male who is new to our clinic and referred by their hepatologist, Dr. Younger for evaluation of bladder cancer.  He has a h/o bladder cancer and is managed by his urologist, Dr. Hanny Keller in [place].   His last report of a bladder tumor (which was treated with TURBT) was in 2017.   He follows with her regularly. He thinks his last cystoscope was ~6 months ago.   He denies any gross hematuria.  No other complaints today.    Review of Systems  Review of Systems  All other systems reviewed and negative except pertinent positives noted in HPI.       Objective:     Physical Exam   Nursing note and vitals reviewed.  Constitutional: He is oriented to person, place, and time. Vital signs are normal. He appears well-developed and well-nourished. He is cooperative.   HENT:   Head: Normocephalic.   Neck: No tracheal deviation present.   Cardiovascular: Normal rate and intact distal pulses.    Pulmonary/Chest: Effort normal. No accessory muscle usage. No tachypnea. No respiratory distress.   Abdominal: Soft. Normal appearance. He exhibits no distension, no fluid wave, no ascites and no mass. There is no tenderness. There is no rebound and no CVA tenderness. No hernia.   Musculoskeletal: Normal range of motion.   Lymphadenopathy:        Right: No inguinal adenopathy present.        Left: No inguinal adenopathy present.   Neurological: He is alert and oriented to person, place, and time. He has normal strength.   Skin: No bruising and no rash noted.     Psychiatric: He has a normal mood and affect. His speech is normal and behavior is normal. Thought content normal.       Lab Review  Lab Results   Component Value Date    COLORU Yellow 07/25/2019    SPECGRAV 1.015 07/25/2019    PHUR 6.0 07/25/2019    NITRITE Negative 07/25/2019    KETONESU Negative 07/25/2019          Assessment:        1. Malignant neoplasm of urinary bladder, unspecified site            Plan:     Malignant neoplasm of urinary bladder, unspecified site    -patient can be cleared by his urologist, Dr. Hanny Keller in [place], [place] who he follows with regularly.  We will request records as well for pathology of his bladder cancer and details of his postoperative follow-up.  However, I see no contraindication from a urologic standpoint for transplant.   -follow-up as needed

## 2019-07-31 ENCOUNTER — COMMITTEE REVIEW (OUTPATIENT)
Dept: TRANSPLANT | Facility: CLINIC | Age: 64
End: 2019-07-31

## 2019-07-31 ENCOUNTER — TELEPHONE (OUTPATIENT)
Dept: TRANSPLANT | Facility: CLINIC | Age: 64
End: 2019-07-31

## 2019-07-31 NOTE — TELEPHONE ENCOUNTER
Patient notified his case was presented to the Liver Selection Committee and he has been approved for listing pending review of the bladder cancer pathology and financial clearance.  Understanding expressed.  Patient states bladder surgery was done at [facility] in 2017.  MA messaged to request pathology report.

## 2019-07-31 NOTE — COMMITTEE REVIEW
Brandon Syed's case presented to selection committee.  Patient has been accepted for liver transplant due to Primary Liver Malignancy: Hepatoma, Hepatocellular Carcinoma (HCC) and complications of end stage liver disease including hepatocellular carcinoma, ascites, portal hypertension, splenomegaly and thrombocytopenia with a MELD score of 6.  Patient has no absolute contraindications for liver transplant.  Patient will be listed pending financial approval and pending review of bladder pathology.     Patient will accept HBcAb positive livers.  Patient will accept HCVAB positive livers.  Patient will not accept DCD livers.  Patient will accept HCV LELE positive livers  Patient will accept HBV LELE positive livers  I was present and agree with the committee's conclusions.

## 2019-08-07 ENCOUNTER — TELEPHONE (OUTPATIENT)
Dept: TRANSPLANT | Facility: CLINIC | Age: 64
End: 2019-08-07

## 2019-08-07 DIAGNOSIS — D49.0 LIVER NEOPLASM: ICD-10-CM

## 2019-08-07 DIAGNOSIS — R94.39 ABNORMAL FINDING ON CARDIOVASCULAR STRESS TEST: Primary | ICD-10-CM

## 2019-08-07 DIAGNOSIS — Z01.818 PRE-TRANSPLANT EVALUATION FOR LIVER TRANSPLANT: ICD-10-CM

## 2019-08-07 DIAGNOSIS — R91.1 LUNG NODULE: ICD-10-CM

## 2019-08-07 NOTE — TELEPHONE ENCOUNTER
Referral entered and appointment card completed.    ----- Message from Ricardo Younger MD sent at 7/26/2019  4:37 PM CDT -----  Results reviewed.  It' s abnormal. Can he see cardiology?

## 2019-08-08 DIAGNOSIS — Z01.818 PRE-TRANSPLANT EVALUATION FOR LIVER TRANSPLANT: Primary | ICD-10-CM

## 2019-08-08 DIAGNOSIS — R94.39 ABNORMAL FINDING ON CARDIOVASCULAR STRESS TEST: ICD-10-CM

## 2019-09-03 ENCOUNTER — HOSPITAL ENCOUNTER (OUTPATIENT)
Dept: RADIOLOGY | Facility: HOSPITAL | Age: 64
Discharge: HOME | End: 2019-09-03
Attending: PHYSICIAN ASSISTANT
Payer: MEDICARE

## 2019-09-03 ENCOUNTER — HOSPITAL ENCOUNTER (OUTPATIENT)
Dept: RADIOLOGY | Facility: HOSPITAL | Age: 64
Discharge: HOME | End: 2019-09-03
Attending: INTERNAL MEDICINE
Payer: MEDICARE

## 2019-09-03 ENCOUNTER — OFFICE VISIT (OUTPATIENT)
Dept: CARDIOLOGY | Facility: CLINIC | Age: 64
End: 2019-09-03
Payer: MEDICARE

## 2019-09-03 VITALS
BODY MASS INDEX: 25.05 KG/M2 | WEIGHT: 184.94 LBS | SYSTOLIC BLOOD PRESSURE: 144 MMHG | HEIGHT: 72 IN | HEART RATE: 68 BPM | DIASTOLIC BLOOD PRESSURE: 68 MMHG

## 2019-09-03 DIAGNOSIS — R91.8 LUNG NODULES: ICD-10-CM

## 2019-09-03 DIAGNOSIS — Z86.19 HX OF HEPATITIS C: Primary | ICD-10-CM

## 2019-09-03 DIAGNOSIS — R91.1 LUNG NODULE: ICD-10-CM

## 2019-09-03 DIAGNOSIS — C22.0 HCC (HEPATOCELLULAR CARCINOMA): ICD-10-CM

## 2019-09-03 DIAGNOSIS — D49.0 LIVER NEOPLASM: ICD-10-CM

## 2019-09-03 DIAGNOSIS — Z01.818 PRE-TRANSPLANT EVALUATION FOR LIVER TRANSPLANT: Primary | ICD-10-CM

## 2019-09-03 LAB
CREAT SERPL-MCNC: 0.8 MG/DL (ref 0.5–1.4)
SAMPLE: NORMAL

## 2019-09-03 PROCEDURE — 74170 CT ABDOMEN W WO CONTRAST: ICD-10-PCS | Mod: 26,TXP,, | Performed by: RADIOLOGY

## 2019-09-03 PROCEDURE — 99203 OFFICE O/P NEW LOW 30 MIN: CPT | Mod: S$PBB,GC,TXP, | Performed by: STUDENT IN AN ORGANIZED HEALTH CARE EDUCATION/TRAINING PROGRAM

## 2019-09-03 PROCEDURE — 71260 CT THORAX DX C+: CPT | Mod: 26,TXP,, | Performed by: RADIOLOGY

## 2019-09-03 PROCEDURE — 99214 OFFICE O/P EST MOD 30 MIN: CPT | Mod: PBBFAC,25,TXP | Performed by: STUDENT IN AN ORGANIZED HEALTH CARE EDUCATION/TRAINING PROGRAM

## 2019-09-03 PROCEDURE — 74170 CT ABD WO CNTRST FLWD CNTRST: CPT | Mod: TC,TXP

## 2019-09-03 PROCEDURE — 99999 PR PBB SHADOW E&M-EST. PATIENT-LVL IV: ICD-10-PCS | Mod: PBBFAC,GC,TXP, | Performed by: STUDENT IN AN ORGANIZED HEALTH CARE EDUCATION/TRAINING PROGRAM

## 2019-09-03 PROCEDURE — 71260 CT CHEST WITH CONTRAST: ICD-10-PCS | Mod: 26,TXP,, | Performed by: RADIOLOGY

## 2019-09-03 PROCEDURE — 71260 CT THORAX DX C+: CPT | Mod: TC,TXP

## 2019-09-03 PROCEDURE — 99203 PR OFFICE/OUTPT VISIT, NEW, LEVL III, 30-44 MIN: ICD-10-PCS | Mod: S$PBB,GC,TXP, | Performed by: STUDENT IN AN ORGANIZED HEALTH CARE EDUCATION/TRAINING PROGRAM

## 2019-09-03 PROCEDURE — 99999 PR PBB SHADOW E&M-EST. PATIENT-LVL IV: CPT | Mod: PBBFAC,GC,TXP, | Performed by: STUDENT IN AN ORGANIZED HEALTH CARE EDUCATION/TRAINING PROGRAM

## 2019-09-03 PROCEDURE — 74170 CT ABD WO CNTRST FLWD CNTRST: CPT | Mod: 26,TXP,, | Performed by: RADIOLOGY

## 2019-09-03 PROCEDURE — 25500020 PHARM REV CODE 255: Mod: TXP | Performed by: INTERNAL MEDICINE

## 2019-09-03 RX ADMIN — IOHEXOL 75 ML: 350 INJECTION, SOLUTION INTRAVENOUS at 02:09

## 2019-09-03 NOTE — PROGRESS NOTES
Subjective:    Patient ID:  Brandon Syed is a 63 y.o. male who presents for evaluation of Pre-op Exam      62 yo M pmhx of PAD s/p aorto-fem bypass, HCC 2/2 Hep C, hx of bladder CA, DM  (a1c 6.1), hypothyroidism here for evaluation prior to liver txp. Recent stress test done in July without acute findings. EF of 60% and target HR was reached. Former smoker who quit >5 yrs ago and has been doing well overall. States he is only fatigued at times but relates that to the current chemo is is taking for his liver. Denies sob, edema, chest pain. States he functions quite well at home and is able to walk more than a block easily without sob.          Stress Echo 7/26/19  · The patient reached the end of the protocol.  · Arrhythmias during stress: rare PVCs,  · There is 0.5 mm of depression in the leads.  · The EKG portion of this study is abnormal but not diagnostic.  · Normal left ventricular systolic function. The estimated ejection fraction is 60%  · Septal wall has abnormal motion.  · Local segmental wall motion abnormalities.  · Normal LV diastolic function.  · Normal right ventricular systolic function.  · Normal central venous pressure (3 mm Hg).  · The estimated PA systolic pressure is 27 mm Hg  · The stress echo portion of this study is negative for myocardial ischemia despite the mild ECG changes noted.      Review of Systems   All other systems reviewed and are negative.       Objective:     Physical Exam  Vital signs reviewed  Constitutional: Oriented to person, place, and time. Appears  well-developed and well-nourished.   HENT:   Head: Normocephalic and atraumatic.   Eyes: EOM are normal.   Neck: Normal range of motion. No JVD present.   Cardiovascular: Normal rate, regular rhythm, normal heart sounds and intact distal pulses. Exam reveals no gallop and no friction rub.   No murmur heard.  Pulmonary/Chest: Effort normal and breath sounds normal. No stridor. No respiratory distress. No wheezes, no rales.  No chest wall tenderness present  Abdominal: Soft. Bowel sounds are normal. Mild distension from known cirrhosis present  Musculoskeletal: There is no edema present   Neurological: Alert and oriented to person, place, and time.   Skin: Skin is warm and dry.   Psychiatric: She has a normal mood and affect.           Assessment:       1. Pre-transplant evaluation for liver transplant         Plan:       - Overall, great candidate for liver txp from a cardiac perspective  - Discussed continuation of a healthy lifestyle of low salt, moderate activity, and healthy eating   - RCRI would place him 6.0% risk for MI, death, or cardiac arrest given the surgery itself is high risk  - Follow up prn

## 2019-09-13 ENCOUNTER — TELEPHONE (OUTPATIENT)
Dept: TRANSPLANT | Facility: CLINIC | Age: 64
End: 2019-09-13

## 2019-09-13 NOTE — TELEPHONE ENCOUNTER
Spoke with Mr. Rojo in reference to coordinating dates for liver transplant evaluation.  Standard and Fast Pass evaluation procedures reviewed, as well as tentative start dates.  Patient expressed understanding and agreed to Fast Pass evaluation Oct 31st/Nov 1st.  Tentative evaluation schedule reviewed.  Patient denies having prior egd/colon and states he will schedule with Dr. Maldonado. Advised of the need to fast after midnight Oct 30th and need to have caregiver attend.  All questions and concerns addressed.   Caregiver and Fast Pass appointment letters mailed.

## 2019-09-17 ENCOUNTER — CONFERENCE (OUTPATIENT)
Dept: TRANSPLANT | Facility: CLINIC | Age: 64
End: 2019-09-17

## 2019-09-17 DIAGNOSIS — D49.0 LIVER NEOPLASM: ICD-10-CM

## 2019-09-17 DIAGNOSIS — K74.60 HEPATIC CIRRHOSIS, UNSPECIFIED HEPATIC CIRRHOSIS TYPE, UNSPECIFIED WHETHER ASCITES PRESENT: ICD-10-CM

## 2019-09-17 DIAGNOSIS — Z01.818 PRE-TRANSPLANT EVALUATION FOR LIVER TRANSPLANT: Primary | ICD-10-CM

## 2019-09-17 DIAGNOSIS — C76.8 MALIGNANT NEOPLASM OF OTHER SPECIFIED ILL-DEFINED SITES: ICD-10-CM

## 2019-09-17 NOTE — TELEPHONE ENCOUNTER
[facility] LIVER COORDINATOR LIVER DISCUSSION CONFERENCE     Brandon Syed's  case was discussed at liver discussion conference.     Discussion/Plan: No contraindication to moving forward with liver transplant based on review of TURBT pathology report.  Need to review most recent cystoscoy result before listing.     Patient notified and expressed understanding.  Patient states he's being followed by Dr. Hanny Keller in [place].    Release of information faxed, requesting most recent office note and cystoscopy result.  Will review with MD upon receipt.

## 2019-09-18 ENCOUNTER — DOCUMENTATION ONLY (OUTPATIENT)
Dept: TRANSPLANT | Facility: CLINIC | Age: 64
End: 2019-09-18

## 2019-09-27 LAB
EXT ALBUMIN: 3.7 (ref 3.5–5)
EXT ALKALINE PHOSPHATASE: 114 (ref 38–126)
EXT ALT: 22 (ref 7–56)
EXT AST: 40 (ref 5–35)
EXT BILIRUBIN TOTAL: 1 (ref 0.2–1.2)
EXT BUN: 9 (ref 7–21)
EXT CALCIUM: 9 (ref 8.8–10.6)
EXT CHLORIDE: 102 (ref 101–111)
EXT CO2: 29 (ref 22–31)
EXT CREATININE: 1 MG/DL (ref 0.5–1.4)
EXT GLUCOSE: 117 (ref 65–110)
EXT HEMATOCRIT: 43.2 (ref 42–52)
EXT HEMOGLOBIN: 14.4 (ref 14–17.4)
EXT INR: 1 (ref 0.9–1.1)
EXT PLATELETS: 74 (ref 150–400)
EXT POTASSIUM: 4.4 (ref 3.5–5.1)
EXT PROTEIN TOTAL: 7.4 (ref 6–7.8)
EXT PT: 13.8 (ref 12.1–14.9)
EXT SODIUM: 138 MMOL/L (ref 136–145)
EXT WBC: 3.56 (ref 4.5–11)

## 2019-09-28 ENCOUNTER — TELEPHONE (OUTPATIENT)
Dept: TRANSPLANT | Facility: CLINIC | Age: 64
End: 2019-09-28

## 2019-09-29 NOTE — TELEPHONE ENCOUNTER
LATE ENTRY 9/25/19:    Patient notified and advised of need for updated labs for listing.  MA to coordinate scheduling labs with local provider    ----- Message from Ricardo Younger MD sent at 9/24/2019  3:13 PM CDT -----  Regarding: RE: listing??    list  ----- Message -----  From: Olga Basurto  Sent: 9/18/2019  12:08 PM CDT  To: Ricardo Younger MD  Subject: FW: listing??                                    He's followed by Dr. Hanny Keller at [facility].    His last office visit and cystoscopy were on 12/11/2018.    PROCEDURE:  Urethral examination was within normal limits.  Prostate exam showed no obstruction.    Bladder examination: Thre were no papillary tumors or worrisome mucosal changes.  Bother ureteral orifices were located in the normal anatomic position and effluxed clear urine.    PLAN:  Repeat in 1 year    Is it ok to list or does he need a repeat cysto    ----- Message -----  From: Olga Basurto  Sent: 9/10/2019   3:30 PM  To: Ricardo Younger MD  Subject: FW: listing??                                    Is it ok to list him?  ----- Message -----  From: Olga Basurto  Sent: 9/3/2019  10:34 AM  To: Ricardo Younger MD  Subject: RE: listing??                                    The surgery was done at [facility].  He was seen and cleared by Dr. Bernstein    Excerpt:     -patient can be cleared by his urologist, Dr. Hanny Keller in [place], [place] who he follows with regularly.  We will request records as well for pathology of his bladder cancer and details of his postoperative follow-up.  However, I see no contraindication from a urologic standpoint for transplant.   -follow-up as needed    ----- Message -----  From: Ricardo Younger MD  Sent: 8/30/2019   4:19 PM  To: Olga Basurto  Subject: RE: listing??                                    Was this done elsewhere or does he have a urologist here?    ----- Message -----  From: Olga Basurto  Sent: 8/30/2019  12:32 PM  To: Ricardo  MD Carisa  Subject: listing??                                        Mr. Villasenor was approved for listing pending review of the pathology report of his bladder tumor    Please review and advise:    Specimen:  A. Superficial tumor  B. Deep bladder biopsy    Diagnosis:  A. Urinary Bladder, Superficial tumor, TURBT:  --Procedure-TURBT  --Tumor site--near left ureteral orifice  --Histologic type--Papillary Urothelial Carcinoma, Noninvasive  --Histologic grade--Low grade  --No muscularis propria (detrusor muscle) identified  --No lymphovascular invasion identified  --Noninvasive papillary carcinoma    B. Urinary bladder, deep bladder cold cup biopsy:  --No neoplasm is identified  --mild chronic cystitis

## 2019-09-30 ENCOUNTER — DOCUMENTATION ONLY (OUTPATIENT)
Dept: TRANSPLANT | Facility: CLINIC | Age: 64
End: 2019-09-30

## 2019-09-30 ENCOUNTER — TELEPHONE (OUTPATIENT)
Dept: TRANSPLANT | Facility: CLINIC | Age: 64
End: 2019-09-30

## 2019-09-30 NOTE — TELEPHONE ENCOUNTER
"  LIVER WAIT LISTING NOTE    **NOTE:   IF ANY EXTERNAL LABS ARE USED FOR LISTING THE VALUES AND DATES MUST BE ENTERED IN EPIC TO GENERATE THIS NOTE**    Date of Financial clearance to list: 2019    [facility]:        Organ: Liver  Name:       Brandon Syed    : 1955          Gender:     male      MRN#: [id number]                                 State of Permanent Residence:   [place] [address]  Ethnicity: /White   Race:      White    CLINICAL INFORMATION       ABO  ABO Blood Group:   O NEG     ABO Confirmation: (THESE DATES MUST BE PRIOR TO THE LIST DATE AND SUPPORTED BY SEPARATE LAB REPORTS)    Internal Results    Lab Results   Component Value Date    GROUPTRH O NEG 2019    GROUPTRH O NEG 2019     No results found for: ABO    External Results    ABO Date 1:  ABO Result 1:  ABO Date 2:  ABO Result 2:     Are either of these ABO results based on External Labs? no  (If Yes, STOP and go to source document in Media Tab for verification).    VITALS  Height:    Ht Readings from Last 1 Encounters:   19 6' (1.829 m)     Weight:    Wt Readings from Last 1 Encounters:   19 83.9 kg (184 lb 15.5 oz)       LIVER ORGAN INFORMATION  Candidate Medical Urgency Status: Active    Number of Previous Transplants: 0    MELD/PELD Data Collection:  Had dialysis twice, or 24 hours of CVVHD, within 1 week prior to the serum creatinine test: No  Encephalopathy: none Date: 2019  Ascites: slight Date: 2019    MELD Score:  MELD- Na score: 6 at 2019  11:22 AM  MELD score: 6 at 2019  11:22 AM        Calculated from:    Lab Results   Component Value Date    EXTINR 1.0 2019    EXTCREATININ 1.0 2019    EXTSODIUM 138 2019    EXTBILITOTAL 1.0 2019    EXTALBUMIN 3.7 2019   Age: 63 years    Additional Organs: none  Kidney: No    If Kidney is "Yes" above, check Diagnosis and enter the medical eligibility below for a simultaneous " liver/kidney:    Diagnosis: Chronic kidney disease (CKD) with measured or calculated GFR less than or equal to 60 ml/min for greater than 90 consecutive days. At least one of the following must qualify for CKD:  Date Begun Dialysis CrCl (ml/min)  Must be < or = 30 eGFR (ml/min) Must be < or = 30    Yes/no:                    Diagnosis: Sustained acute kidney injury (must be confirmed at least once every 7 days). Please select at least one of the following criteria:  Date of test or treatment Dialysis received CrCl (ml/min) Must be < or = 30 eGFR (ml/min) Must be < or = 25 Number of days since previous test or treatment (must be less than or equal to 7 days)    Yes/No:                  Diagnosis: Metabolic disease, Check all diagnosis that apply:     Hyperoxaluria    Atypical hemolytic uremic syndrome (HUS) from mutations in factor H or factor I    Familial non-neuropathic systemic amyloidosis    Methylmalonic aciduria     Transplant nephrologist confirming candidate's most recent diagnosis for SLK: n/a               ## Please submit a separate Kidney Listing note for combined Liver/Kidney patients. ##    Will Recipient Accept?   Accept HBcAB Positive Organ:  yes  Accept HBV LELE Positive Organ:  yes  Accept HCV Antibody Positive Organ: yes   Accept HCV LELE Positive Organ:  yes                        Local: No                           Import: No  Accept DCD Organ:    no  Minimum acceptable donor age:  5 years  Maximum acceptable donor age:  99 years  Minimum acceptable donor weight:  40 lbs    Maximum acceptable donor weight:  440 lb  Maximum miles the organ or  Recovery team will travel:   5000 miles    TCR Information    Citizenship: [place] Citizen   Country of permanent residence:   Year of entry to the [place]:   Highest education level: High School (9-12) or GED    Patient on Life Support: No  Functional Status: 90% - able to carry on normal activity, minor symptoms of disease  Working for income: No  If no, reason not  working: Disability  Previous Pancreas Islet Infusion - No  Source of payment: Public Insurance - Medicare FFS (Fee for service)  Diabetes: Type II  Any previous malignancy: Yes, Liver and Other (bladder)  Neoadjuvant Therapy: No  Has candidate ever had a diagnosis of HCC: Yes    Liver Medical Factors  Previous abdominal surgery: Yes  Spontaneous Bacterial Peritonitis: No  History of Portal Vein Thrombosis: No  Transjugular Intrahepatic Portosystemic Shunt: No

## 2019-10-09 ENCOUNTER — TELEPHONE (OUTPATIENT)
Dept: TRANSPLANT | Facility: CLINIC | Age: 64
End: 2019-10-09

## 2019-10-09 NOTE — TELEPHONE ENCOUNTER
----- Message from Rosmery Cordova sent at 10/9/2019  1:41 PM CDT -----  Contact: Awa (wife) @ [phone]  Pt is scheduled to come in tomorrow.  Calling to see if you received his labs from [facility].  Pls call.

## 2019-10-09 NOTE — TELEPHONE ENCOUNTER
Spoke to Pt's wife, stated Pt had labs drawn 9/26/2019. Ext labs entered in Epic. Wife wants to know if Pt has to have labs again tomorrow, Pt's arm bruises easily from needle sticks, wife wants to know if lab draw can be held to avoid the needle stick. Informed wife I will consult with Dr. Younger and let her know in the morning. Wife stated understanding.

## 2019-10-10 ENCOUNTER — OFFICE VISIT (OUTPATIENT)
Dept: TRANSPLANT | Facility: CLINIC | Age: 64
End: 2019-10-10
Payer: MEDICARE

## 2019-10-10 ENCOUNTER — LAB VISIT (OUTPATIENT)
Dept: LAB | Facility: HOSPITAL | Age: 64
End: 2019-10-10
Attending: INTERNAL MEDICINE
Payer: MEDICARE

## 2019-10-10 VITALS
OXYGEN SATURATION: 97 % | HEIGHT: 72 IN | BODY MASS INDEX: 25.62 KG/M2 | DIASTOLIC BLOOD PRESSURE: 69 MMHG | HEART RATE: 68 BPM | WEIGHT: 189.13 LBS | RESPIRATION RATE: 16 BRPM | SYSTOLIC BLOOD PRESSURE: 138 MMHG | TEMPERATURE: 98 F

## 2019-10-10 DIAGNOSIS — C76.8 MALIGNANT NEOPLASM OF OTHER SPECIFIED ILL-DEFINED SITES: ICD-10-CM

## 2019-10-10 DIAGNOSIS — D49.0 LIVER NEOPLASM: ICD-10-CM

## 2019-10-10 DIAGNOSIS — Z01.818 PRE-TRANSPLANT EVALUATION FOR LIVER TRANSPLANT: ICD-10-CM

## 2019-10-10 DIAGNOSIS — K74.69 OTHER CIRRHOSIS OF LIVER: ICD-10-CM

## 2019-10-10 DIAGNOSIS — K74.60 HEPATIC CIRRHOSIS, UNSPECIFIED HEPATIC CIRRHOSIS TYPE, UNSPECIFIED WHETHER ASCITES PRESENT: ICD-10-CM

## 2019-10-10 LAB
ALBUMIN SERPL BCP-MCNC: 3.3 G/DL (ref 3.5–5.2)
ALP SERPL-CCNC: 118 U/L (ref 55–135)
ALT SERPL W/O P-5'-P-CCNC: 24 U/L (ref 10–44)
AMPHET+METHAMPHET UR QL: NEGATIVE
ANION GAP SERPL CALC-SCNC: 7 MMOL/L (ref 8–16)
AST SERPL-CCNC: 42 U/L (ref 10–40)
BARBITURATES UR QL SCN>200 NG/ML: NEGATIVE
BASOPHILS # BLD AUTO: 0.01 K/UL (ref 0–0.2)
BASOPHILS NFR BLD: 0.3 % (ref 0–1.9)
BENZODIAZ UR QL SCN>200 NG/ML: NORMAL
BILIRUB SERPL-MCNC: 0.6 MG/DL (ref 0.1–1)
BUN SERPL-MCNC: 10 MG/DL (ref 8–23)
BZE UR QL SCN: NEGATIVE
CALCIUM SERPL-MCNC: 9 MG/DL (ref 8.7–10.5)
CANNABINOIDS UR QL SCN: NEGATIVE
CHLORIDE SERPL-SCNC: 105 MMOL/L (ref 95–110)
CO2 SERPL-SCNC: 26 MMOL/L (ref 23–29)
CREAT SERPL-MCNC: 1 MG/DL (ref 0.5–1.4)
CREAT UR-MCNC: 152 MG/DL (ref 23–375)
DIFFERENTIAL METHOD: ABNORMAL
EOSINOPHIL # BLD AUTO: 0.2 K/UL (ref 0–0.5)
EOSINOPHIL NFR BLD: 7.6 % (ref 0–8)
ERYTHROCYTE [DISTWIDTH] IN BLOOD BY AUTOMATED COUNT: 13.7 % (ref 11.5–14.5)
EST. GFR  (AFRICAN AMERICAN): >60 ML/MIN/1.73 M^2
EST. GFR  (NON AFRICAN AMERICAN): >60 ML/MIN/1.73 M^2
ETHANOL UR-MCNC: <10 MG/DL
GLUCOSE SERPL-MCNC: 162 MG/DL (ref 70–110)
HCT VFR BLD AUTO: 41.7 % (ref 40–54)
HGB BLD-MCNC: 13.9 G/DL (ref 14–18)
IMM GRANULOCYTES # BLD AUTO: 0.01 K/UL (ref 0–0.04)
IMM GRANULOCYTES NFR BLD AUTO: 0.3 % (ref 0–0.5)
INR PPP: 1 (ref 0.8–1.2)
LYMPHOCYTES # BLD AUTO: 0.4 K/UL (ref 1–4.8)
LYMPHOCYTES NFR BLD: 12.2 % (ref 18–48)
MCH RBC QN AUTO: 30.3 PG (ref 27–31)
MCHC RBC AUTO-ENTMCNC: 33.3 G/DL (ref 32–36)
MCV RBC AUTO: 91 FL (ref 82–98)
METHADONE UR QL SCN>300 NG/ML: NEGATIVE
MONOCYTES # BLD AUTO: 0.2 K/UL (ref 0.3–1)
MONOCYTES NFR BLD: 7.6 % (ref 4–15)
NEUTROPHILS # BLD AUTO: 2.2 K/UL (ref 1.8–7.7)
NEUTROPHILS NFR BLD: 72 % (ref 38–73)
NRBC BLD-RTO: 0 /100 WBC
OPIATES UR QL SCN: NORMAL
PCP UR QL SCN>25 NG/ML: NEGATIVE
PLATELET # BLD AUTO: 71 K/UL (ref 150–350)
PMV BLD AUTO: 9.6 FL (ref 9.2–12.9)
POTASSIUM SERPL-SCNC: 4.6 MMOL/L (ref 3.5–5.1)
PROT SERPL-MCNC: 7.8 G/DL (ref 6–8.4)
PROTHROMBIN TIME: 10.3 SEC (ref 9–12.5)
RBC # BLD AUTO: 4.58 M/UL (ref 4.6–6.2)
SODIUM SERPL-SCNC: 138 MMOL/L (ref 136–145)
TOXICOLOGY INFORMATION: NORMAL
WBC # BLD AUTO: 3.03 K/UL (ref 3.9–12.7)

## 2019-10-10 PROCEDURE — 85610 PROTHROMBIN TIME: CPT | Mod: TXP

## 2019-10-10 PROCEDURE — 36415 COLL VENOUS BLD VENIPUNCTURE: CPT | Mod: TXP

## 2019-10-10 PROCEDURE — 80321 ALCOHOLS BIOMARKERS 1OR 2: CPT | Mod: TXP

## 2019-10-10 PROCEDURE — 99213 OFFICE O/P EST LOW 20 MIN: CPT | Mod: PBBFAC,TXP | Performed by: INTERNAL MEDICINE

## 2019-10-10 PROCEDURE — 99999 PR PBB SHADOW E&M-EST. PATIENT-LVL III: CPT | Mod: PBBFAC,TXP,, | Performed by: INTERNAL MEDICINE

## 2019-10-10 PROCEDURE — 85025 COMPLETE CBC W/AUTO DIFF WBC: CPT | Mod: TXP

## 2019-10-10 PROCEDURE — 99214 OFFICE O/P EST MOD 30 MIN: CPT | Mod: S$PBB,TXP,, | Performed by: INTERNAL MEDICINE

## 2019-10-10 PROCEDURE — 80053 COMPREHEN METABOLIC PANEL: CPT | Mod: TXP

## 2019-10-10 PROCEDURE — 99999 PR PBB SHADOW E&M-EST. PATIENT-LVL III: ICD-10-PCS | Mod: PBBFAC,TXP,, | Performed by: INTERNAL MEDICINE

## 2019-10-10 PROCEDURE — 99214 PR OFFICE/OUTPT VISIT, EST, LEVL IV, 30-39 MIN: ICD-10-PCS | Mod: S$PBB,TXP,, | Performed by: INTERNAL MEDICINE

## 2019-10-10 PROCEDURE — 80307 DRUG TEST PRSMV CHEM ANLYZR: CPT | Mod: TXP

## 2019-10-10 NOTE — LETTER
October 10, 2019        Gurpreet Hernandez  [address]  [facility]  [place] [address]  Phone: [phone]  Fax: [phone]             [facility] - Liver Transplant  [address]  [facility] [address]  Phone: [phone]   Patient: Brandon Syed   MR Number: [id number]   YOB: 1955   Date of Visit: 10/10/2019       Dear Dr. Gurpreet Hernandez    Thank you for referring Brandon Syed to me for evaluation. Attached you will find relevant portions of my assessment and plan of care.    If you have questions, please do not hesitate to call me. I look forward to following Brandon Syed along with you.    Sincerely,    Ricardo Younger MD    Enclosure    If you would like to receive this communication electronically, please contact [email or web address] or [phone] to request [email or web address] Link access.    [email or web address] Link is a tool which provides read-only access to select patient information with whom you have a relationship. Its easy to use and provides real time access to review your patients record including encounter summaries, notes, results, and demographic information.    If you feel you have received this communication in error or would no longer like to receive these types of communications, please e-mail [email or web address]

## 2019-10-10 NOTE — PROGRESS NOTES
Subjective:       Patient ID: Brandon Syed is a 63 y.o. male.    Chief Complaint: No chief complaint on file.    HPI  I saw this 63 y.o. man who was first seen in Aug 2018 for evaluation of presumed HCC.  He has a h/o HCV cirrhosis, s/p treatment with IFN+RBV and victralis with SVR.     He has a h/o a subcentimeter liver mass, followed by serial imaging. In 02/2018  mass noted to be 24 mm and AFP was 13.8. Mass was biopsied no evidence of HCC; benign liver parenchyma with cirrhosis and moderate steatosis.    Pt reported severe abdominal pain starting 07/2018 accompanied by fatigue. He went to follow up and AFP repeated and had increased to 6360.      MRI 08/20/18 and a mass measuring 7.2x7.2x5.7 noted wih peripheral enhancement and pseudocapsule felt to be HCC. Furthermore, hepatic dome may be contiguous with the aforementioned mass and a second lesion may have coalesced with larger mass.       He has a h/o bladder carcinoma, noted 07/2017, s/p TURBT. Further PMH of HLD and DMII.     AFP 16976 on 8/29/2018  AFP 1.3 on 6/18/2019    IR conference review:  Pulmonary nodules on CT are non specific. Rec continued surveillance. Fleischer guidelines rec f/u in 6-12 months. MRI shows 8.1 cm heterogeneous mass in segments 5/9 and possibly extending to segment 7. Theres associated PVT. Appearance suggests infiltrative process which given AFP is favored to be HCC. Rec Y90.    Y90 on 10/18/18    He was followed with Dr Lora (Oncology, [facility])  - on lenvatinib    IR conference review: 6/11/2019  POSSIBLE RIGHT PV THROMBUS, NO ENHANCEMENT SEEN, NO DISEASE BURDEN SEEN ON IMAGING     CT chest/abdo: 6/`18/2019  1. In this patient with with a history of hepatocellular carcinoma, there is redemonstration of mass in hepatic segment VIII demonstrating appearance consistent with post treatment change.  No new hepatic lesions identified.  2. Postsurgical changes aorto bi-iliac endovascular stent placement.  3.  Splenomegaly.  4. Clustered micro nodules within the left upper lobe suggesting small airways or small vessels disease.  5. Additional findings as above.    Since then he was evaluated and was listed for transplantation on 9/30/2019 given that he was down staged to within [place] criteria.    Last imaging was a CT scan on 9/3/2019 that showed no new liver lesions but a new 9 mm irregular left basal nodule that we will continue to follow.    PMH:  aortofemoral bypass- 2016    SH:  Ex smoker- stopped 2014 ( 40 year pack smoker)    Review of Systems   Constitutional: Negative for activity change, appetite change, chills, fatigue, fever and unexpected weight change.   HENT: Negative for hearing loss.    Eyes: Negative for discharge and visual disturbance.   Respiratory: Negative for cough, chest tightness, shortness of breath and wheezing.    Cardiovascular: Negative for chest pain, palpitations and leg swelling.   Gastrointestinal: Negative for abdominal distention, abdominal pain, constipation, diarrhea and nausea.   Genitourinary: Negative for dysuria and frequency.   Musculoskeletal: Negative for arthralgias and back pain.   Skin: Negative for pallor and rash.   Neurological: Negative for dizziness, tremors, speech difficulty and headaches.   Hematological: Negative for adenopathy.   Psychiatric/Behavioral: Negative for agitation and confusion.            Lab Results   Component Value Date    ALT 24 10/10/2019    AST 42 (H) 10/10/2019     (H) 07/25/2019    ALKPHOS 118 10/10/2019    BILITOT 0.6 10/10/2019     Past Medical History:   Diagnosis Date    Arthritis     Bladder cancer 2017    s/p TURBT    Chronic lower back pain     Diabetes     Hx of hepatitis C     Hyperlipemia     Upper back pain      Past Surgical History:   Procedure Laterality Date    BLADDER SURGERY      TURBT    FEMORAL BYPASS       Current Outpatient Medications   Medication Sig    DEXILANT 60 mg capsule Take 60 mg by mouth once  daily.    diazePAM (VALIUM) 5 MG tablet Take 5 mg by mouth 2 (two) times daily.     lenvatinib (LENVIMA) 4 mg Cap Take by mouth once daily.    levothyroxine (SYNTHROID) 50 MCG tablet Take 50 mcg by mouth once daily.    levothyroxine (SYNTHROID) 75 MCG tablet     lovastatin (MEVACOR) 20 MG tablet Take 20 mg by mouth every evening.     metFORMIN (GLUCOPHAGE-XR) 500 MG 24 hr tablet Take 500 mg by mouth once daily.    oxyCODONE (ROXICODONE) 20 mg Tab immediate release tablet Take 20 mg by mouth every 4 (four) hours as needed.    promethazine (PHENERGAN) 25 MG tablet Take 25 mg by mouth every 6 (six) hours as needed.     No current facility-administered medications for this visit.      Facility-Administered Medications Ordered in Other Visits   Medication    0.9%  NaCl infusion    heparin infusion 1,000 units/500 ml in 0.9% NaCl (pressure line flush)       Objective:      Physical Exam   Constitutional: He is oriented to person, place, and time. He appears well-nourished.   HENT:   Head: Normocephalic.   Eyes: Pupils are equal, round, and reactive to light.   Neck: No thyromegaly present.   Cardiovascular: Normal rate, regular rhythm and normal heart sounds.   Pulmonary/Chest: Effort normal and breath sounds normal. He has no wheezes.   Abdominal: Soft. He exhibits no distension and no mass. There is no tenderness.       Lymphadenopathy:     He has no cervical adenopathy.   Neurological: He is alert and oriented to person, place, and time.   Skin: Skin is warm. No rash noted. No erythema.   Psychiatric: He has a normal mood and affect. His behavior is normal.       Assessment:       1. Malignant neoplasm of other specified ill-defined sites    2. Liver neoplasm    3. Pre-transplant evaluation for liver transplant        Plan:   HCV cirrhosis with advanced HCC,  He has responded well to Y90 given in Oct 2018 and he was recently considered down-staged and suitable for listing for liver Tx.    He maintains an  excellent functional status.  I do note a small nodule in his left lung base that needs follow up in 3 months.    Clinic in 3 months with repeat tCT abdo/chest.

## 2019-10-10 NOTE — PROGRESS NOTES
Gave patient a urine cup with wipes. Instructed patient to wipe with wipes provided first then urinate a little bit in the bowl and the rest in the sterile cup that was provide. Verified patients name and . Collected.

## 2019-10-15 LAB — PHOSPHATIDYLETHANOL (PETH): NEGATIVE NG/ML

## 2019-11-11 NOTE — PROGRESS NOTES
3 month tumor surveillance's scheduled Dec 10th.    Orders entered and appointment card completed for clinic follow-up in 3 months (Jan 2020) with labs

## 2019-11-25 DIAGNOSIS — Z01.818 PRE-TRANSPLANT EVALUATION FOR LIVER TRANSPLANT: Primary | ICD-10-CM

## 2019-11-25 DIAGNOSIS — C22.0 HCC (HEPATOCELLULAR CARCINOMA): ICD-10-CM

## 2019-12-06 ENCOUNTER — TELEPHONE (OUTPATIENT)
Dept: TRANSPLANT | Facility: CLINIC | Age: 64
End: 2019-12-06

## 2019-12-06 NOTE — TELEPHONE ENCOUNTER
Pt explain that his oncology schedule ct chest and abd on 12/10 dr myers is aware of it once imaging is done they will send cd over as completed.

## 2019-12-16 ENCOUNTER — DOCUMENTATION ONLY (OUTPATIENT)
Dept: TRANSPLANT | Facility: CLINIC | Age: 64
End: 2019-12-16

## 2019-12-16 NOTE — NURSING
Chart reviewed and appointment card completed for waitlist management tests/consults to be scheduled July 2020

## 2020-01-07 ENCOUNTER — LAB VISIT (OUTPATIENT)
Dept: LAB | Facility: HOSPITAL | Age: 65
End: 2020-01-07
Attending: INTERNAL MEDICINE
Payer: MEDICARE

## 2020-01-07 ENCOUNTER — OFFICE VISIT (OUTPATIENT)
Dept: TRANSPLANT | Facility: CLINIC | Age: 65
End: 2020-01-07
Payer: MEDICARE

## 2020-01-07 VITALS
OXYGEN SATURATION: 95 % | BODY MASS INDEX: 25.47 KG/M2 | WEIGHT: 188.06 LBS | RESPIRATION RATE: 16 BRPM | HEIGHT: 72 IN | SYSTOLIC BLOOD PRESSURE: 169 MMHG | DIASTOLIC BLOOD PRESSURE: 82 MMHG | HEART RATE: 68 BPM | TEMPERATURE: 98 F

## 2020-01-07 DIAGNOSIS — C76.8 MALIGNANT NEOPLASM OF OTHER SPECIFIED ILL-DEFINED SITES: ICD-10-CM

## 2020-01-07 DIAGNOSIS — D49.0 LIVER NEOPLASM: ICD-10-CM

## 2020-01-07 DIAGNOSIS — Z01.818 PRE-TRANSPLANT EVALUATION FOR LIVER TRANSPLANT: ICD-10-CM

## 2020-01-07 DIAGNOSIS — K73.9 CHRONIC HEPATITIS, UNSPECIFIED: ICD-10-CM

## 2020-01-07 DIAGNOSIS — K74.69 OTHER CIRRHOSIS OF LIVER: ICD-10-CM

## 2020-01-07 LAB
ALBUMIN SERPL BCP-MCNC: 3.2 G/DL (ref 3.5–5.2)
ALP SERPL-CCNC: 110 U/L (ref 55–135)
ALT SERPL W/O P-5'-P-CCNC: 30 U/L (ref 10–44)
AMPHET+METHAMPHET UR QL: NEGATIVE
ANION GAP SERPL CALC-SCNC: 6 MMOL/L (ref 8–16)
AST SERPL-CCNC: 43 U/L (ref 10–40)
BARBITURATES UR QL SCN>200 NG/ML: NEGATIVE
BASOPHILS # BLD AUTO: 0.02 K/UL (ref 0–0.2)
BASOPHILS NFR BLD: 0.6 % (ref 0–1.9)
BENZODIAZ UR QL SCN>200 NG/ML: NORMAL
BILIRUB SERPL-MCNC: 0.8 MG/DL (ref 0.1–1)
BUN SERPL-MCNC: 8 MG/DL (ref 8–23)
BZE UR QL SCN: NEGATIVE
CALCIUM SERPL-MCNC: 9.2 MG/DL (ref 8.7–10.5)
CANNABINOIDS UR QL SCN: NORMAL
CHLORIDE SERPL-SCNC: 104 MMOL/L (ref 95–110)
CO2 SERPL-SCNC: 28 MMOL/L (ref 23–29)
CREAT SERPL-MCNC: 1.1 MG/DL (ref 0.5–1.4)
CREAT UR-MCNC: 34 MG/DL (ref 23–375)
DIFFERENTIAL METHOD: ABNORMAL
EOSINOPHIL # BLD AUTO: 0.3 K/UL (ref 0–0.5)
EOSINOPHIL NFR BLD: 8 % (ref 0–8)
ERYTHROCYTE [DISTWIDTH] IN BLOOD BY AUTOMATED COUNT: 14.4 % (ref 11.5–14.5)
EST. GFR  (AFRICAN AMERICAN): >60 ML/MIN/1.73 M^2
EST. GFR  (NON AFRICAN AMERICAN): >60 ML/MIN/1.73 M^2
ETHANOL UR-MCNC: <10 MG/DL
GLUCOSE SERPL-MCNC: 106 MG/DL (ref 70–110)
HCT VFR BLD AUTO: 44.4 % (ref 40–54)
HGB BLD-MCNC: 13.9 G/DL (ref 14–18)
IMM GRANULOCYTES # BLD AUTO: 0.01 K/UL (ref 0–0.04)
IMM GRANULOCYTES NFR BLD AUTO: 0.3 % (ref 0–0.5)
INR PPP: 1 (ref 0.8–1.2)
LYMPHOCYTES # BLD AUTO: 0.5 K/UL (ref 1–4.8)
LYMPHOCYTES NFR BLD: 14.4 % (ref 18–48)
MCH RBC QN AUTO: 29.9 PG (ref 27–31)
MCHC RBC AUTO-ENTMCNC: 31.3 G/DL (ref 32–36)
MCV RBC AUTO: 96 FL (ref 82–98)
METHADONE UR QL SCN>300 NG/ML: NEGATIVE
MONOCYTES # BLD AUTO: 0.5 K/UL (ref 0.3–1)
MONOCYTES NFR BLD: 12.5 % (ref 4–15)
NEUTROPHILS # BLD AUTO: 2.3 K/UL (ref 1.8–7.7)
NEUTROPHILS NFR BLD: 64.2 % (ref 38–73)
NRBC BLD-RTO: 0 /100 WBC
OPIATES UR QL SCN: NORMAL
PCP UR QL SCN>25 NG/ML: NEGATIVE
PLATELET # BLD AUTO: 73 K/UL (ref 150–350)
PMV BLD AUTO: 10.1 FL (ref 9.2–12.9)
POTASSIUM SERPL-SCNC: 4.5 MMOL/L (ref 3.5–5.1)
PROT SERPL-MCNC: 7.3 G/DL (ref 6–8.4)
PROTHROMBIN TIME: 10.4 SEC (ref 9–12.5)
RBC # BLD AUTO: 4.65 M/UL (ref 4.6–6.2)
SODIUM SERPL-SCNC: 138 MMOL/L (ref 136–145)
TOXICOLOGY INFORMATION: NORMAL
WBC # BLD AUTO: 3.61 K/UL (ref 3.9–12.7)

## 2020-01-07 PROCEDURE — 99214 OFFICE O/P EST MOD 30 MIN: CPT | Mod: S$PBB,TXP,, | Performed by: INTERNAL MEDICINE

## 2020-01-07 PROCEDURE — 80307 DRUG TEST PRSMV CHEM ANLYZR: CPT | Mod: TXP

## 2020-01-07 PROCEDURE — 36415 COLL VENOUS BLD VENIPUNCTURE: CPT | Mod: TXP

## 2020-01-07 PROCEDURE — 85025 COMPLETE CBC W/AUTO DIFF WBC: CPT | Mod: TXP

## 2020-01-07 PROCEDURE — 80053 COMPREHEN METABOLIC PANEL: CPT | Mod: TXP

## 2020-01-07 PROCEDURE — 99214 PR OFFICE/OUTPT VISIT, EST, LEVL IV, 30-39 MIN: ICD-10-PCS | Mod: S$PBB,TXP,, | Performed by: INTERNAL MEDICINE

## 2020-01-07 PROCEDURE — 99214 OFFICE O/P EST MOD 30 MIN: CPT | Mod: PBBFAC,TXP | Performed by: INTERNAL MEDICINE

## 2020-01-07 PROCEDURE — 99999 PR PBB SHADOW E&M-EST. PATIENT-LVL IV: CPT | Mod: PBBFAC,TXP,, | Performed by: INTERNAL MEDICINE

## 2020-01-07 PROCEDURE — 99999 PR PBB SHADOW E&M-EST. PATIENT-LVL IV: ICD-10-PCS | Mod: PBBFAC,TXP,, | Performed by: INTERNAL MEDICINE

## 2020-01-07 PROCEDURE — 80321 ALCOHOLS BIOMARKERS 1OR 2: CPT | Mod: TXP

## 2020-01-07 PROCEDURE — 85610 PROTHROMBIN TIME: CPT | Mod: TXP

## 2020-01-07 RX ORDER — CALCIUM CITRATE/VITAMIN D3 200MG-6.25
TABLET ORAL
Refills: 11 | COMMUNITY
Start: 2019-10-07

## 2020-01-07 RX ORDER — CLINDAMYCIN PHOSPHATE 10 MG/G
GEL TOPICAL
COMMUNITY
Start: 2019-12-20

## 2020-01-07 RX ORDER — FLUTICASONE PROPIONATE 50 MCG
SPRAY, SUSPENSION (ML) NASAL
COMMUNITY
Start: 2019-12-20

## 2020-01-07 NOTE — PROGRESS NOTES
Subjective:       Patient ID: Brandon Syed is a 64 y.o. male.    Chief Complaint: No chief complaint on file.    HPI  I saw this 64 y.o. man who was first seen in Aug 2018 for evaluation of presumed HCC.  He has a h/o HCV cirrhosis, s/p treatment with IFN+RBV and victralis with SVR.     He has a h/o a subcentimeter liver mass, followed by serial imaging. In 02/2018  mass noted to be 24 mm and AFP was 13.8. Mass was biopsied no evidence of HCC; benign liver parenchyma with cirrhosis and moderate steatosis.    Pt reported severe abdominal pain starting 07/2018 accompanied by fatigue. He went to follow up and AFP repeated and had increased to 6360.      MRI 08/20/18 and a mass measuring 7.2x7.2x5.7 noted wih peripheral enhancement and pseudocapsule felt to be HCC. Furthermore, hepatic dome may be contiguous with the aforementioned mass and a second lesion may have coalesced with larger mass.       He has a h/o bladder carcinoma, noted 07/2017, s/p TURBT.  Last cystoscopy on Jan 6 2020- all clear (Dr Hanny Keller)     Further PMH of HLD and DMII.     AFP 02612 on 8/29/2018  AFP 1.4 on 9/3/2019    IR conference review:  Pulmonary nodules on CT are non specific. Rec continued surveillance. Fleischer guidelines rec f/u in 6-12 months. MRI shows 8.1 cm heterogeneous mass in segments 5/9 and possibly extending to segment 7. Theres associated PVT. Appearance suggests infiltrative process which given AFP is favored to be HCC. Rec Y90.    Y90 on 10/18/18    He was followed with Dr Lora (Oncology, [facility])  - on lenvatinib    IR conference review: 6/11/2019  POSSIBLE RIGHT PV THROMBUS, NO ENHANCEMENT SEEN, NO DISEASE BURDEN SEEN ON IMAGING     CT chest/abdo: 6/`18/2019  1. In this patient with with a history of hepatocellular carcinoma, there is redemonstration of mass in hepatic segment VIII demonstrating appearance consistent with post treatment change.  No new hepatic lesions identified.  2. Postsurgical  changes aorto bi-iliac endovascular stent placement.  3. Splenomegaly.  4. Clustered micro nodules within the left upper lobe suggesting small airways or small vessels disease.  5. Additional findings as above.    Since then he was evaluated and was listed for transplantation on 9/30/2019 given that he was down staged to within [place] criteria.    Last imaging was a CT scan on 12/10/19    PMH:  aortofemoral bypass- 2016    SH:  Ex smoker- stopped 2014 ( 40 year pack smoker)    Review of Systems   Constitutional: Negative for activity change, appetite change, chills, fatigue, fever and unexpected weight change.   HENT: Negative for hearing loss.    Eyes: Negative for discharge and visual disturbance.   Respiratory: Negative for cough, chest tightness, shortness of breath and wheezing.    Cardiovascular: Negative for chest pain, palpitations and leg swelling.   Gastrointestinal: Negative for abdominal distention, abdominal pain, constipation, diarrhea and nausea.   Genitourinary: Negative for dysuria and frequency.   Musculoskeletal: Negative for arthralgias and back pain.   Skin: Negative for pallor and rash.   Neurological: Negative for dizziness, tremors, speech difficulty and headaches.   Hematological: Negative for adenopathy.   Psychiatric/Behavioral: Negative for agitation and confusion.            Lab Results   Component Value Date    ALT 30 01/07/2020    AST 43 (H) 01/07/2020     (H) 07/25/2019    ALKPHOS 110 01/07/2020    BILITOT 0.8 01/07/2020     Past Medical History:   Diagnosis Date    Arthritis     Bladder cancer 2017    s/p TURBT    Chronic lower back pain     Diabetes     Hx of hepatitis C     Hyperlipemia     Upper back pain      Past Surgical History:   Procedure Laterality Date    BLADDER SURGERY      TURBT    FEMORAL BYPASS       Current Outpatient Medications   Medication Sig    clindamycin phosphate 1% (CLINDAGEL) 1 % gel     DEXILANT 60 mg capsule Take 60 mg by mouth once  daily.    diazePAM (VALIUM) 5 MG tablet Take 5 mg by mouth 2 (two) times daily.     fluticasone propionate (FLONASE) 50 mcg/actuation nasal spray by Each Nostril route as needed.    lenvatinib (LENVIMA) 4 mg Cap Take by mouth once daily.    levothyroxine (SYNTHROID) 88 MCG tablet Take 88 mcg by mouth once daily.     lovastatin (MEVACOR) 20 MG tablet Take 20 mg by mouth every evening.     metFORMIN (GLUCOPHAGE-XR) 500 MG 24 hr tablet Take 500 mg by mouth as needed.     oxyCODONE (ROXICODONE) 20 mg Tab immediate release tablet Take 20 mg by mouth every 4 (four) hours as needed.    promethazine (PHENERGAN) 25 MG tablet Take 25 mg by mouth every 6 (six) hours as needed.    TRUE METRIX GLUCOSE TEST STRIP Strp CHECK BLOOD GLUCOSE ONCE DAILY    levothyroxine (SYNTHROID) 75 MCG tablet      No current facility-administered medications for this visit.      Facility-Administered Medications Ordered in Other Visits   Medication    0.9%  NaCl infusion    heparin infusion 1,000 units/500 ml in 0.9% NaCl (pressure line flush)       Objective:      Physical Exam   Constitutional: He is oriented to person, place, and time. He appears well-nourished.   HENT:   Head: Normocephalic.   Eyes: Pupils are equal, round, and reactive to light.   Neck: No thyromegaly present.   Cardiovascular: Normal rate, regular rhythm and normal heart sounds.   Pulmonary/Chest: Effort normal and breath sounds normal. He has no wheezes.   Abdominal: Soft. He exhibits no distension and no mass. There is no tenderness.       Lymphadenopathy:     He has no cervical adenopathy.   Neurological: He is alert and oriented to person, place, and time.   Skin: Skin is warm. No rash noted. No erythema.   Psychiatric: He has a normal mood and affect. His behavior is normal.       Assessment:       1. Malignant neoplasm of other specified ill-defined sites    2. Liver neoplasm    3. Pre-transplant evaluation for liver transplant        Plan:   HCV cirrhosis  with advanced HCC,  He responded well to Y90 given in Oct 2018 and he isonsidered down-staged and suitable for listing for liver Tx.  Active on list since Sep 2019 with a MELD of 6.    He maintains an excellent functional status.  I do note a small nodule in his left lung base that needs ongoing CT follow up in 3 months.    Clinic in 3 months with repeat tCT abdo/chest.    - has received all his vaccines  Pneumococcal  HBV ?x3  Tetanus  Flu  Shingles

## 2020-01-07 NOTE — LETTER
January 7, 2020        Gurpreet Hernandez  [address] [facility]  GASTROENTEROLOGY CENTER  [place] [address]  Phone: [phone]  Fax: [phone]             [facility] - Liver Transplant  [address]  [facility] [address]  Phone: [phone]   Patient: Brandon Syed   MR Number: [id number]   YOB: 1955   Date of Visit: 1/7/2020       Dear Dr. Gurpreet Hernandez    Thank you for referring Brandon Syed to me for evaluation. Attached you will find relevant portions of my assessment and plan of care.    If you have questions, please do not hesitate to call me. I look forward to following Brandon Syed along with you.    Sincerely,    Ricardo Younger MD    Enclosure    If you would like to receive this communication electronically, please contact [email or web address] or [phone] to request [employer] access.    [employer] is a tool which provides read-only access to select patient information with whom you have a relationship. Its easy to use and provides real time access to review your patients record including encounter summaries, notes, results, and demographic information.    If you feel you have received this communication in error or would no longer like to receive these types of communications, please e-mail [email or web address]

## 2020-01-08 DIAGNOSIS — Z76.82 LIVER TRANSPLANT CANDIDATE: ICD-10-CM

## 2020-01-08 DIAGNOSIS — C22.0 HCC (HEPATOCELLULAR CARCINOMA): Primary | ICD-10-CM

## 2020-01-08 DIAGNOSIS — K74.60 HEPATIC CIRRHOSIS, UNSPECIFIED HEPATIC CIRRHOSIS TYPE, UNSPECIFIED WHETHER ASCITES PRESENT: ICD-10-CM

## 2020-01-09 ENCOUNTER — DOCUMENTATION ONLY (OUTPATIENT)
Dept: TRANSPLANT | Facility: CLINIC | Age: 65
End: 2020-01-09

## 2020-01-09 NOTE — PROGRESS NOTES
Orders entered and appointment card completed for clinic follow-up in 3 months (April 2020) with labs and surveillance CT c/a

## 2020-01-09 NOTE — PROGRESS NOTES
PATIENT NAME: Brandon Syed  [facility] #: [id number]    Lab Results   Component Value Date    CREATININE 1.1 01/07/2020     01/07/2020    BILITOT 0.8 01/07/2020    ALBUMIN 3.2 (L) 01/07/2020    INR 1.0 01/07/2020       Encephalopathy: none  Ascites: slight  Dialysis: no     Recertification requestor: Olga Basurto     MELD 7

## 2020-01-13 LAB — PHOSPHATIDYLETHANOL (PETH): NEGATIVE NG/ML

## 2020-03-11 ENCOUNTER — TELEPHONE (OUTPATIENT)
Dept: TRANSPLANT | Facility: CLINIC | Age: 65
End: 2020-03-11

## 2020-03-11 NOTE — TELEPHONE ENCOUNTER
Encounter to enter results of external CT chest, abdomen, and dated 12/10/19. Hard copies sent for scanning.      Images reviewed at Liver Conference 12/31/19

## 2020-03-18 ENCOUNTER — TELEPHONE (OUTPATIENT)
Dept: TRANSPLANT | Facility: CLINIC | Age: 65
End: 2020-03-18

## 2020-03-18 NOTE — TELEPHONE ENCOUNTER
Call returned.  Patient says his father is very ill and is in the hospital.  Says he's traveling to [place] for the next couple of weeks to take care of him.  Says he will be back by April 7th, for his follow-up appointment.      Patient advised appointments will remain as scheduled, at this time.  Pending reassessment of Covid-19, at time of visit, appointment may be changed to a video visit.  Patient expressed understanding and agreement.      Chart noted with travel plans.     ----- Message from Liya Dallas LPN sent at 3/18/2020 11:55 AM CDT -----  Contact: PTs Wife      ----- Message -----  From: Kimi Barnett  Sent: 3/18/2020  11:49 AM CDT  To: [facility] Liver Tx Clinical Support    Wife called requesting to speak with coordinator Olga - regarding pt leaving [facility] and needs to speak with Olga first     Callback: [phone]

## 2020-04-02 ENCOUNTER — PATIENT MESSAGE (OUTPATIENT)
Dept: TRANSPLANT | Facility: CLINIC | Age: 65
End: 2020-04-02

## 2020-04-06 ENCOUNTER — TELEPHONE (OUTPATIENT)
Dept: TRANSPLANT | Facility: CLINIC | Age: 65
End: 2020-04-06

## 2020-04-06 NOTE — TELEPHONE ENCOUNTER
Called patient to have him epre-check in for his appt tomorrow with Dr. Younger. Patient ready to begin visit.

## 2020-04-07 ENCOUNTER — TELEPHONE (OUTPATIENT)
Dept: TRANSPLANT | Facility: CLINIC | Age: 65
End: 2020-04-07

## 2020-04-07 ENCOUNTER — OFFICE VISIT (OUTPATIENT)
Dept: TRANSPLANT | Facility: CLINIC | Age: 65
End: 2020-04-07
Payer: MEDICARE

## 2020-04-07 DIAGNOSIS — K74.60 HEPATIC CIRRHOSIS, UNSPECIFIED HEPATIC CIRRHOSIS TYPE, UNSPECIFIED WHETHER ASCITES PRESENT: ICD-10-CM

## 2020-04-07 DIAGNOSIS — Z76.82 LIVER TRANSPLANT CANDIDATE: ICD-10-CM

## 2020-04-07 DIAGNOSIS — C22.0 HCC (HEPATOCELLULAR CARCINOMA): ICD-10-CM

## 2020-04-07 PROCEDURE — 99213 OFFICE O/P EST LOW 20 MIN: CPT | Mod: 95,TXP,, | Performed by: INTERNAL MEDICINE

## 2020-04-07 PROCEDURE — 99213 PR OFFICE/OUTPT VISIT, EST, LEVL III, 20-29 MIN: ICD-10-PCS | Mod: 95,TXP,, | Performed by: INTERNAL MEDICINE

## 2020-04-07 NOTE — PROGRESS NOTES
Subjective:       Patient ID: Brandon Syed is a 64 y.o. male.    Chief Complaint: Waitlist Maintenance  The patient location is: Home  The chief complaint leading to consultation is: HCC- waitlist management  Visit type: Virtual visit with synchronous audio and video  Total time spent with patient: 15 min  Each patient to whom he or she provides medical services by telemedicine is:  (1) informed of the relationship between the physician and patient and the respective role of any other health care provider with respect to management of the patient; and (2) notified that he or she may decline to receive medical services by telemedicine and may withdraw from such care at any time.      HPI  I saw this 64 y.o. man who was first seen in Aug 2018 for evaluation of presumed HCC.  He has a h/o HCV cirrhosis, s/p treatment with IFN+RBV and victralis with SVR.     He has a h/o a subcentimeter liver mass, followed by serial imaging. In 02/2018  mass noted to be 24 mm and AFP was 13.8. Mass was biopsied no evidence of HCC; benign liver parenchyma with cirrhosis and moderate steatosis.    Pt reported severe abdominal pain starting 07/2018 accompanied by fatigue. He went to follow up and AFP repeated and had increased to 6360.      MRI 08/20/18 and a mass measuring 7.2x7.2x5.7 noted wih peripheral enhancement and pseudocapsule felt to be HCC. Furthermore, hepatic dome may be contiguous with the aforementioned mass and a second lesion may have coalesced with larger mass.       He has a h/o bladder carcinoma, noted 07/2017, s/p TURBT.  Last cystoscopy on Jan 6 2020- all clear (Dr Hanny Keller)     Further PMH of HLD and DMII.     AFP 13748 on 8/29/2018  AFP 1.4 on 9/3/2019    IR conference review:  Pulmonary nodules on CT are non specific. Rec continued surveillance. Fleischer guidelines rec f/u in 6-12 months. MRI shows 8.1 cm heterogeneous mass in segments 5/9 and possibly extending to segment 7. Theres associated PVT.  Appearance suggests infiltrative process which given AFP is favored to be HCC. Rec Y90.    Y90 on 10/18/18    He was followed with Dr Lora (Oncology, [facility])  - on lenvatinib    IR conference review: 6/11/2019  POSSIBLE RIGHT PV THROMBUS, NO ENHANCEMENT SEEN, NO DISEASE BURDEN SEEN ON IMAGING     CT chest/abdo: 6/`18/2019  1. In this patient with with a history of hepatocellular carcinoma, there is redemonstration of mass in hepatic segment VIII demonstrating appearance consistent with post treatment change.  No new hepatic lesions identified.  2. Postsurgical changes aorto bi-iliac endovascular stent placement.  3. Splenomegaly.  4. Clustered micro nodules within the left upper lobe suggesting small airways or small vessels disease.  5. Additional findings as above.    Since then he was evaluated and was listed for transplantation on 9/30/2019 given that he was down staged to within [place] criteria.    Last imaging was a CT scan on 12/10/19  He was due for repeat imaging today but this     PMH:  aortofemoral bypass- 2016    SH:  Ex smoker- stopped 2014 ( 40 year pack smoker)    Review of Systems   Constitutional: Negative for activity change, appetite change, chills, fatigue, fever and unexpected weight change.   HENT: Negative for hearing loss.    Eyes: Negative for discharge and visual disturbance.   Respiratory: Negative for cough, chest tightness, shortness of breath and wheezing.    Cardiovascular: Negative for chest pain, palpitations and leg swelling.   Gastrointestinal: Negative for abdominal distention, abdominal pain, constipation, diarrhea and nausea.   Genitourinary: Negative for dysuria and frequency.   Musculoskeletal: Negative for arthralgias and back pain.   Skin: Negative for pallor and rash.   Neurological: Negative for dizziness, tremors, speech difficulty and headaches.   Hematological: Negative for adenopathy.   Psychiatric/Behavioral: Negative for agitation and confusion.             Lab Results   Component Value Date    ALT 30 01/07/2020    AST 43 (H) 01/07/2020     (H) 07/25/2019    ALKPHOS 110 01/07/2020    BILITOT 0.8 01/07/2020     Past Medical History:   Diagnosis Date    Arthritis     Bladder cancer 2017    s/p TURBT    Chronic lower back pain     Diabetes     Hx of hepatitis C     Hyperlipemia     Upper back pain      Past Surgical History:   Procedure Laterality Date    BLADDER SURGERY      TURBT    FEMORAL BYPASS       Current Outpatient Medications   Medication Sig    clindamycin phosphate 1% (CLINDAGEL) 1 % gel     DEXILANT 60 mg capsule Take 60 mg by mouth once daily.    diazePAM (VALIUM) 5 MG tablet Take 5 mg by mouth 2 (two) times daily.     fluticasone propionate (FLONASE) 50 mcg/actuation nasal spray by Each Nostril route as needed.    lenvatinib (LENVIMA) 4 mg Cap Take by mouth once daily.    levothyroxine (SYNTHROID) 75 MCG tablet     levothyroxine (SYNTHROID) 88 MCG tablet Take 88 mcg by mouth once daily.     lovastatin (MEVACOR) 20 MG tablet Take 20 mg by mouth every evening.     metFORMIN (GLUCOPHAGE-XR) 500 MG 24 hr tablet Take 500 mg by mouth as needed.     oxyCODONE (ROXICODONE) 20 mg Tab immediate release tablet Take 20 mg by mouth every 4 (four) hours as needed.    promethazine (PHENERGAN) 25 MG tablet Take 25 mg by mouth every 6 (six) hours as needed.    TRUE METRIX GLUCOSE TEST STRIP Strp CHECK BLOOD GLUCOSE ONCE DAILY     No current facility-administered medications for this visit.      Facility-Administered Medications Ordered in Other Visits   Medication    0.9%  NaCl infusion    heparin infusion 1,000 units/500 ml in 0.9% NaCl (pressure line flush)       Objective:    NOT DONE- VIDEO VISIT      Assessment:       1. HCC (hepatocellular carcinoma)    2. Liver transplant candidate    3. Hepatic cirrhosis, unspecified hepatic cirrhosis type, unspecified whether ascites present         Plan:   HCV cirrhosis with advanced HCC,  He  responded well to Y90 given in Oct 2018 and he isonsidered down-staged and suitable for listing for liver Tx.  Active on list since Sep 2019 with a MELD of 6.    He maintains an excellent functional status.  We will repeat his routine follow up scans as soon as feasible.    Clinic in 3 months.

## 2020-04-07 NOTE — LETTER
April 8, 2020        Gurpreet Hernandez  [address] [facility]  GASTROENTEROLOGY CENTER  [place] [address]  Phone: [phone]  Fax: [phone]             Vini Smith - Liver Transplant  [address]  [facility] [address]  Phone: [phone]   Patient: Brandon Syed   MR Number: [id number]   YOB: 1955   Date of Visit: 4/7/2020       Dear Dr. Gurpreet Hernandez    Thank you for referring Brandon Syed to me for evaluation. Attached you will find relevant portions of my assessment and plan of care.    If you have questions, please do not hesitate to call me. I look forward to following Brandon Syed along with you.    Sincerely,    Ricardo Younger MD    Enclosure    If you would like to receive this communication electronically, please contact [email or web address] or [phone] to request [employer] access.    [employer] is a tool which provides read-only access to select patient information with whom you have a relationship. Its easy to use and provides real time access to review your patients record including encounter summaries, notes, results, and demographic information.    If you feel you have received this communication in error or would no longer like to receive these types of communications, please e-mail [email or web address]

## 2020-04-07 NOTE — TELEPHONE ENCOUNTER
CoVid-19 precaution - Transplant [facility]IE follow-up conducted with patient and wife by telephone, [phone].    SW spoke with pt and pts wife via phone today post video conference with hepatologist this morning.    Pt and pts wife alert and engaged and communicative during call.   Pt and pts wife deny current coping issues and report they are following social isolation at home during Covid pandemic.       Patient and pts wife ready for transplant whenever the call would come for patient.   Patient's wife, Mendy Villasenor, phone [phone], remains the primary caregiver for patient.     Pt reports that his cancer dr at home put him on Celexa for depression symptoms, which is helping patient to cope better at this time.   Pt and pts wife managing financially, but pts wife admit that they have not done any fundraising to prepare for transplant medical and non medical related expenses.  SW discussed importance of having a rainy day transplant fund, even though pts medical costs covered by insurance.  Pts wife verbalized understanding.  SW encouraged fundraising when pandemic crisis over if possible with family, friends or [employer].     Patient due for annual transplant appointments in July 2020, so STEPHANIE discussed that STEPH[facility]IE team will hopefully meet with pt and pts wife in person at that time.   Pt and pts wife with no further psychosocial needs at this time.  SW remains available for all transplant resources and psychosocial support.

## 2020-04-14 LAB
EXT AFP: 1.2
EXT ALBUMIN: 3.7
EXT ALT: 24
EXT AST: 42
EXT BILIRUBIN TOTAL: 0.8
EXT BUN: 7
EXT CALCIUM: 8.3
EXT CHLORIDE: 106
EXT CO2: 25
EXT CREATININE: 1 MG/DL
EXT GLUCOSE: 86
EXT HEMATOCRIT: 46.8
EXT HEMOGLOBIN: 14.7
EXT PLATELETS: 52
EXT POTASSIUM: 4.6
EXT PROTEIN TOTAL: 7.2
EXT SODIUM: 139 MMOL/L
EXT WBC: 2.53

## 2020-04-17 NOTE — PROGRESS NOTES
Orders entered and appointment card completed for clinic follow-up in 3 months (July 2020) with labs  Will continue to monitor for appropriate time to reschedule surveillance imaging and labs.

## 2020-05-07 ENCOUNTER — TELEPHONE (OUTPATIENT)
Dept: TRANSPLANT | Facility: CLINIC | Age: 65
End: 2020-05-07

## 2020-05-07 NOTE — TELEPHONE ENCOUNTER
Pt wife explain pt father is dying and as of now they are located in  [place] which means they wont be able to do labs she did explain she can continue with the ep visit if we like but as for labs they would have to wait until they get back home to [place] to get it done

## 2020-05-19 ENCOUNTER — DOCUMENTATION ONLY (OUTPATIENT)
Dept: TRANSPLANT | Facility: CLINIC | Age: 65
End: 2020-05-19

## 2020-05-29 ENCOUNTER — TELEPHONE (OUTPATIENT)
Dept: TRANSPLANT | Facility: CLINIC | Age: 65
End: 2020-05-29

## 2020-05-30 ENCOUNTER — HOSPITAL ENCOUNTER (EMERGENCY)
Facility: HOSPITAL | Age: 65
Discharge: HOME | End: 2020-05-30
Attending: EMERGENCY MEDICINE
Payer: MEDICARE

## 2020-05-30 VITALS
BODY MASS INDEX: 24.38 KG/M2 | WEIGHT: 180 LBS | HEART RATE: 63 BPM | HEIGHT: 72 IN | OXYGEN SATURATION: 95 % | DIASTOLIC BLOOD PRESSURE: 74 MMHG | TEMPERATURE: 98 F | SYSTOLIC BLOOD PRESSURE: 170 MMHG | RESPIRATION RATE: 18 BRPM

## 2020-05-30 DIAGNOSIS — Z20.822 COVID-19 VIRUS NOT DETECTED: Primary | ICD-10-CM

## 2020-05-30 LAB — SARS-COV-2 RDRP RESP QL NAA+PROBE: NEGATIVE

## 2020-05-30 PROCEDURE — 99282 EMERGENCY DEPT VISIT SF MDM: CPT | Mod: NTX

## 2020-05-30 PROCEDURE — 99284 EMERGENCY DEPT VISIT MOD MDM: CPT | Mod: NTX,,, | Performed by: PHYSICIAN ASSISTANT

## 2020-05-30 PROCEDURE — 99284 PR EMERGENCY DEPT VISIT,LEVEL IV: ICD-10-PCS | Mod: NTX,,, | Performed by: PHYSICIAN ASSISTANT

## 2020-05-30 PROCEDURE — U0002 COVID-19 LAB TEST NON-CDC: HCPCS | Mod: NTX

## 2020-05-30 NOTE — ED PROVIDER NOTES
Encounter Date: 2020       History     Chief Complaint   Patient presents with    Covid Test     Pt arrived for liver transplant and needs covid testing. Denies any current complaints.     HPI   Brandon Syed is a 64 y.o. male with medical history of HCV Cirrhosis presenting to the ED with the chief complaint of COVID-19. Patient is on the liver transplant wait-list and was instructed to present to the ED this AM to be tested for COVID-19. He is going to be on standby today for a liver transplant. He denies any complaints and feels at his baseline health.     Review of patient's allergies indicates:  No Known Allergies  Past Medical History:   Diagnosis Date    Arthritis     Bladder cancer 2017    s/p TURBT    Chronic lower back pain     Diabetes     Hx of hepatitis C     Hyperlipemia     Upper back pain      Past Surgical History:   Procedure Laterality Date    BLADDER SURGERY      TURBT    FEMORAL BYPASS       No family history on file.  Social History     Tobacco Use    Smoking status: Former Smoker     Last attempt to quit:      Years since quittin.4    Smokeless tobacco: Never Used   Substance Use Topics    Alcohol use: No     Frequency: Never    Drug use: No     Review of Systems   Constitutional: Negative for fever.   HENT: Negative for sore throat.    Respiratory: Negative for shortness of breath.    Cardiovascular: Negative for chest pain.   Gastrointestinal: Negative for nausea.   Genitourinary: Negative for dysuria.   Musculoskeletal: Negative for back pain.   Skin: Negative for rash.   Neurological: Negative for weakness.   Hematological: Does not bruise/bleed easily.       Physical Exam     Initial Vitals [20 0343]   BP Pulse Resp Temp SpO2   (!) 181/75 (!) 58 16 97.7 °F (36.5 °C) 97 %      MAP       --         Physical Exam    Constitutional: He appears well-developed and well-nourished. He is not diaphoretic. No distress.   HENT:   Head: Normocephalic and atraumatic.    Mouth/Throat: Oropharynx is clear and moist. No oropharyngeal exudate.   Eyes: EOM are normal. Pupils are equal, round, and reactive to light.   Neck: Normal range of motion. Neck supple.   Cardiovascular: Normal rate, regular rhythm and intact distal pulses.   Pulmonary/Chest: Breath sounds normal. No respiratory distress. He has no wheezes.   Abdominal: Soft. Bowel sounds are normal. There is no tenderness. There is no guarding.   Musculoskeletal: Normal range of motion. He exhibits no edema or tenderness.   Lymphadenopathy:     He has no cervical adenopathy.   Neurological: He is alert and oriented to person, place, and time. He has normal strength. No cranial nerve deficit.   Skin: Skin is warm and dry. No erythema.       ED Course   Procedures  Labs Reviewed   SARS-COV-2 RNA AMPLIFICATION, QUAL          Imaging Results    None          Medical Decision Making:   History:   Old Medical Records: I decided to obtain old medical records.  Old Records Summarized: records from clinic visits.  Clinical Tests:   Lab Tests: Ordered and Reviewed  Other:   I have discussed this case with another health care provider.       <> Summary of the Discussion: Liver transplant       APC / Resident Notes:   64 y.o. male with medical history of HCV Cirrhosis presenting to the ED for COVID-19 testing. Patient is to be on standby for a liver transplant today and instructed to present to the ED for testing. Patient feeling at his baseline health and denies any medical complaints.    COVID-19 negative. Discussed with liver transplant. Patient okay to be discharged and instructed to go to the 11th floor [facility] waiting room for further direction. His transplant coordinator will be in contact with him. Patient discharged and escorted to specified area. Patient expresses understanding and agreeable to the plan. Return to ED precautions given for new, worsening, or concerning symptoms.                               Clinical  Impression:       ICD-10-CM ICD-9-CM   1. Covid-19 Virus not Detected [id number]          Disposition:   Disposition: Discharged  Condition: Stable     ED Disposition Condition    Discharge Stable        ED Prescriptions     None        Follow-up Information     Follow up With Specialties Details Why Contact Info Additional Information    [facility] - Liver Transplant Transplant   [address]  [facility] [id number] [phone] 1st Floor - [facility], located by clinic tower elevators                                     Jasmeet Salamanca PA-C  05/30/20 0516

## 2020-05-30 NOTE — TELEPHONE ENCOUNTER
BACK-UP ORGAN OFFER NOTE    Notified by Kofi Peralta, , of backup liver offer with donor information.  Donor and recipient information read back and verified.  Spoke with Brandon Syed and identified no acute medical issues in telephone assessment.  Patient instructed NPO at 0100. Patient verbalized understanding that he has been called as a backup. Patient also understands that he must report to the ED at 0700 on 5/30/2020 to be swabbed for COVID-19, after which time he will report to the waiting room on the 11th floor of the hospital to wait as a back-up patient. Understanding expressed. Instructions read back & confirmed. Pt's wife also expressed understanding of instructions.    Spoke to Arnold, ER charge RN, to give the er team a heads up that pt will be there for a rapid COVID test at 0700 on 5/30/2020.

## 2020-05-31 ENCOUNTER — TELEPHONE (OUTPATIENT)
Dept: TRANSPLANT | Facility: CLINIC | Age: 65
End: 2020-05-31

## 2020-06-01 NOTE — TELEPHONE ENCOUNTER
Notified by Kofi Peralta that patient could be released as back up from liver transplant case.   Patient called instructed and instructed to eat and could go home. Verbalized understanding. Thanked him for his patience.

## 2020-06-05 ENCOUNTER — TELEPHONE (OUTPATIENT)
Dept: TRANSPLANT | Facility: CLINIC | Age: 65
End: 2020-06-05

## 2020-06-05 NOTE — TELEPHONE ENCOUNTER
Call returned with no answer. Unable to leave a voice message because voice mailbox has not been set up.    ----- Message from Emiliana Medley sent at 6/5/2020  1:06 PM CDT -----  Contact: Mrs Syed wife  Pt wife is calling to speak to coordinator      Pt contact [phone]

## 2020-06-05 NOTE — TELEPHONE ENCOUNTER
Return call received from JAMES Villasenor. Patient says his father passed away and he needs to return to [place] to help his mother. Says he plans on leaving 6/6 and will be gone ~1-2 weeks. Patient agrees to call when he returns.    Says he had updated imaging and labs with Dr. Lora.  Advised I will call for results and schedule follow-up when he returns. Understanding expressed.

## 2020-06-24 DIAGNOSIS — K73.9 CHRONIC HEPATITIS, UNSPECIFIED: ICD-10-CM

## 2020-06-24 DIAGNOSIS — Z76.82 LIVER TRANSPLANT CANDIDATE: ICD-10-CM

## 2020-06-24 DIAGNOSIS — Z11.4 ENCOUNTER FOR SCREENING FOR HUMAN IMMUNODEFICIENCY VIRUS (HIV): ICD-10-CM

## 2020-06-24 DIAGNOSIS — Z12.5 ENCOUNTER FOR SCREENING FOR MALIGNANT NEOPLASM OF PROSTATE: ICD-10-CM

## 2020-06-24 DIAGNOSIS — K74.69 OTHER CIRRHOSIS OF LIVER: ICD-10-CM

## 2020-06-24 DIAGNOSIS — C22.0 HCC (HEPATOCELLULAR CARCINOMA): Primary | ICD-10-CM

## 2020-06-26 ENCOUNTER — TELEPHONE (OUTPATIENT)
Dept: TRANSPLANT | Facility: CLINIC | Age: 65
End: 2020-06-26

## 2020-06-26 NOTE — TELEPHONE ENCOUNTER
Waitlist documentation updated.     ----- Message from Ricardo Younger MD sent at 6/26/2020 10:47 AM CDT -----  A  ----- Message -----  From: Olga Basurto  Sent: 6/24/2020   9:40 AM CDT  To: Ricardo Younger MD    Hi Dr. FERNANDEZ    Please advise on patient's medical complexity.    Thanks  ----- Message -----  From: Gia Jerome RN  Sent: 6/22/2020   9:48 AM CDT  To: Olga Basurto    Need medical complexity

## 2020-07-07 ENCOUNTER — HOSPITAL ENCOUNTER (OUTPATIENT)
Dept: RADIOLOGY | Facility: HOSPITAL | Age: 65
Discharge: HOME | End: 2020-07-07
Attending: INTERNAL MEDICINE
Payer: MEDICARE

## 2020-07-07 ENCOUNTER — HOSPITAL ENCOUNTER (OUTPATIENT)
Dept: CARDIOLOGY | Facility: HOSPITAL | Age: 65
Discharge: HOME | End: 2020-07-07
Attending: INTERNAL MEDICINE
Payer: MEDICARE

## 2020-07-07 ENCOUNTER — SOCIAL WORK (OUTPATIENT)
Dept: TRANSPLANT | Facility: CLINIC | Age: 65
End: 2020-07-07
Payer: MEDICARE

## 2020-07-07 VITALS
SYSTOLIC BLOOD PRESSURE: 124 MMHG | BODY MASS INDEX: 23.03 KG/M2 | HEIGHT: 72 IN | RESPIRATION RATE: 18 BRPM | WEIGHT: 170 LBS | HEART RATE: 59 BPM | DIASTOLIC BLOOD PRESSURE: 60 MMHG

## 2020-07-07 DIAGNOSIS — K74.69 OTHER CIRRHOSIS OF LIVER: ICD-10-CM

## 2020-07-07 DIAGNOSIS — Z76.82 LIVER TRANSPLANT CANDIDATE: ICD-10-CM

## 2020-07-07 DIAGNOSIS — K73.9 CHRONIC HEPATITIS, UNSPECIFIED: ICD-10-CM

## 2020-07-07 DIAGNOSIS — C22.0 HCC (HEPATOCELLULAR CARCINOMA): ICD-10-CM

## 2020-07-07 LAB
ASCENDING AORTA: 2.97 CM
BSA FOR ECHO PROCEDURE: 1.98 M2
CV ECHO LV RWT: 0.29 CM
CV STRESS BASE HR: 59 BPM
DIASTOLIC BLOOD PRESSURE: 60 MMHG
DOP CALC LVOT AREA: 3.4 CM2
DOP CALC LVOT DIAMETER: 2.08 CM
DOP CALC LVOT PEAK VEL: 0.83 M/S
DOP CALC LVOT STROKE VOLUME: 66.67 CM3
DOP CALCLVOT PEAK VEL VTI: 19.63 CM
E WAVE DECELERATION TIME: 197.84 MSEC
E/A RATIO: 1.22
E/E' RATIO: 7.42 M/S
ECHO LV POSTERIOR WALL: 0.67 CM (ref 0.6–1.1)
FRACTIONAL SHORTENING: 39 % (ref 28–44)
INTERVENTRICULAR SEPTUM: 0.75 CM (ref 0.6–1.1)
IVRT: 79.92 MSEC
LA MAJOR: 4.85 CM
LA MINOR: 4.88 CM
LA WIDTH: 3.86 CM
LEFT ATRIUM SIZE: 4.29 CM
LEFT ATRIUM VOLUME INDEX: 34.4 ML/M2
LEFT ATRIUM VOLUME: 68.48 CM3
LEFT INTERNAL DIMENSION IN SYSTOLE: 2.76 CM (ref 2.1–4)
LEFT VENTRICLE DIASTOLIC VOLUME INDEX: 47.86 ML/M2
LEFT VENTRICLE DIASTOLIC VOLUME: 95.15 ML
LEFT VENTRICLE MASS INDEX: 50 G/M2
LEFT VENTRICLE SYSTOLIC VOLUME INDEX: 14.3 ML/M2
LEFT VENTRICLE SYSTOLIC VOLUME: 28.51 ML
LEFT VENTRICULAR INTERNAL DIMENSION IN DIASTOLE: 4.56 CM (ref 3.5–6)
LEFT VENTRICULAR MASS: 99.63 G
LV LATERAL E/E' RATIO: 6.85 M/S
LV SEPTAL E/E' RATIO: 8.09 M/S
MV PEAK A VEL: 0.73 M/S
MV PEAK E VEL: 0.89 M/S
MV STENOSIS PRESSURE HALF TIME: 57.37 MS
MV VALVE AREA P 1/2 METHOD: 3.83 CM2
OHS CV CPX 1 MINUTE RECOVERY HEART RATE: 144 BPM
OHS CV CPX 85 PERCENT MAX PREDICTED HEART RATE MALE: 134
OHS CV CPX MAX PREDICTED HEART RATE: 158
OHS CV CPX PATIENT IS FEMALE: 0
OHS CV CPX PATIENT IS MALE: 1
OHS CV CPX PEAK DIASTOLIC BLOOD PRESSURE: 57 MMHG
OHS CV CPX PEAK HEAR RATE: 144 BPM
OHS CV CPX PEAK RATE PRESSURE PRODUCT: NORMAL
OHS CV CPX PEAK SYSTOLIC BLOOD PRESSURE: 175 MMHG
OHS CV CPX PERCENT MAX PREDICTED HEART RATE ACHIEVED: 91
OHS CV CPX RATE PRESSURE PRODUCT PRESENTING: 7316
PULM VEIN S/D RATIO: 1.34
PV PEAK D VEL: 0.47 M/S
PV PEAK S VEL: 0.63 M/S
RA MAJOR: 4.69 CM
RA WIDTH: 3.59 CM
RIGHT VENTRICULAR END-DIASTOLIC DIMENSION: 3.06 CM
RV TISSUE DOPPLER FREE WALL SYSTOLIC VELOCITY 1 (APICAL 4 CHAMBER VIEW): 11.89 CM/S
SINUS: 2.95 CM
STJ: 2.76 CM
SYSTOLIC BLOOD PRESSURE: 124 MMHG
TDI LATERAL: 0.13 M/S
TDI SEPTAL: 0.11 M/S
TDI: 0.12 M/S
TRICUSPID ANNULAR PLANE SYSTOLIC EXCURSION: 2.16 CM

## 2020-07-07 PROCEDURE — 93975 VASCULAR STUDY: CPT | Mod: 26,TXP,, | Performed by: RADIOLOGY

## 2020-07-07 PROCEDURE — 63600175 PHARM REV CODE 636 W HCPCS: Mod: TXP | Performed by: INTERNAL MEDICINE

## 2020-07-07 PROCEDURE — 63600150 PHARM REV CODE 636: Mod: TXP | Performed by: INTERNAL MEDICINE

## 2020-07-07 PROCEDURE — 93975 US ABDOMEN COMP WITH DOPPLER (XPD): ICD-10-PCS | Mod: 26,TXP,, | Performed by: RADIOLOGY

## 2020-07-07 PROCEDURE — 93351 STRESS ECHO (CUPID ONLY): ICD-10-PCS | Mod: 26,TXP,, | Performed by: INTERNAL MEDICINE

## 2020-07-07 PROCEDURE — 93975 VASCULAR STUDY: CPT | Mod: TC,TXP

## 2020-07-07 PROCEDURE — 93351 STRESS TTE COMPLETE: CPT | Mod: TXP

## 2020-07-07 PROCEDURE — 93351 STRESS TTE COMPLETE: CPT | Mod: 26,TXP,, | Performed by: INTERNAL MEDICINE

## 2020-07-07 RX ORDER — ATROPINE SULFATE 0.1 MG/ML
0.75 INJECTION INTRAVENOUS
Status: COMPLETED | OUTPATIENT
Start: 2020-07-07 | End: 2020-07-07

## 2020-07-07 RX ORDER — DOBUTAMINE HYDROCHLORIDE 100 MG/100ML
40 INJECTION INTRAVENOUS
Status: COMPLETED | OUTPATIENT
Start: 2020-07-07 | End: 2020-07-07

## 2020-07-07 RX ADMIN — DOBUTAMINE IN DEXTROSE 40 MCG/KG/MIN: 100 INJECTION, SOLUTION INTRAVENOUS at 08:07

## 2020-07-07 RX ADMIN — ATROPINE SULFATE 0.75 MG: 0.1 INJECTION PARENTERAL at 08:07

## 2020-07-07 NOTE — PROGRESS NOTES
"Transplant Recipient Adult Psychosocial Assessment    Brandon Syed  [address] [place] [address]  Telephone Information:   Mobile [phone]   Home  [phone] (home)  Work  There is no work phone number on file.  E-mail  [email or web address]    Sex: male  YOB: 1955  Age: 64 y.o.    Encounter Date: 7/7/2020  U[place] Citizen: yes  Primary Language: English   Needed: no    Emergency Contact:  Name: Mendy Syed  Relationship: wife  Address: Same address   Phone Numbers:  [phone] (home), [phone](mobile)    Family/Social Support:   Number of dependents/: None.  Marital history: Pt and spouse reported been together 40 years,  for 37 years.   Other family dynamics: Pt and wife have 3 adult together, 2 daughters are  and have children, and pt's son resides with pt and pt's wife at the address mentioned in this report. P's mother resides in [place], pt's father passed away recently "last month" due to colon cancer. Pt has two siblings: 1 brother passed away due to diabetes and 1 sister living, however has back problems due to a past accident.     Household Composition:  Name: Mendy Seyd  Relationship: wife  Does person drive? yes    Name: Brandon Syed  Age: 25  Relationship: son  Does person drive? yes    Name: Brandon Syed  Age: 64   Relationship: patient  Does person drive? yes    Do you and your caregivers have access to reliable transportation? yes  PRIMARY CAREGIVER: Mendy Syed will be primary caregiver, phone number: [phone] (home), [phone](mobile) . Caregiver denied having any prior commitment or obligations that would conflict with assisting as caregiver. Caregiver denies having medical limitation, nor utilizes medical equipment. Caregiver does drive and has reliable transportation. SW provided education on caregiver roles, caregiver verbalized understanding.    SECONDARY CAREGIVER: Janette Machuca will be secondary " caregiver, phone number: [phone]. Pt and caregiver placed daughter on Speakerphone during SW visit in clinic. SW provided education on caregiver roles, caregiver verbalized understanding. Caregiver denied having prior commitment or obligations. Caregiver reported will have  if needed at the time she would be assisting pt and primary caregiver at the time of transplant. Care reported is currently not working, does drive, and denies having medical limitations or utilizing medical equipment. Caregiver however is in remission for [place].      provided in-depth information to patient and caregiver regarding pre- and post-transplant caregiver role.   strongly encourages patient and caregiver to have concrete plan regarding post-transplant care giving, including back-up caregiver(s) to ensure care giving needs are met as needed.    Patient and Caregiver states understanding all aspects of caregiver role/commitment and is able/willing/committed to being caregiver to the fullest extent necessary.    Patient and Caregiver verbalizes understanding of the education provided today and caregiver responsibilities.         remains available. Patient and Caregiver agree to contact  in a timely manner if concerns arise.      Able to take time off work without financial concerns: no.     Additional Significant Others who will Assist with Transplant:  Name: Brandon Syed [phone]  Age: 25  City: [place]  State: [place]  Relationship: son  Does person drive? yes   Caregiver is currently not working, resides with pt and pt's wife. Pt and pt's wife stated son would be a back up caregiver along, with daughter)    Name: Janette Machuca [phone] (CONFIRMED)   Age: 33  City: [place] State: [place]  Relationship: daughter  Does person drive? yes     Living Will: no  Healthcare Power of : no  Advance Directives on file: <<no information> per medical record.  Verbally  reviewed LW/HCPA information.   provided patient with copy of LW/HCPA documents and provided education on completion of forms.    Living Donors: No.    Highest Education Level: High School (9-12) or GED  Reading Ability: 12th grade  Reports difficulty with: seeing and hearing. Pt reported does wear prescription glasses and will be his using his father's hearing aid, once hearing aide is adjusted to fit pt.   Learns Best By:  Combination of written, reading, visual, and hands on.      Status: no  VA Benefits: no     Working for Income: yes  If yes, working activity level: Working Part Time Due to Disability  Patient is disabled.  Prior to disability, patient  was employed as a  for 40 years.     Spouse/Significant Other Employment: None. Pt's wife reported last worked at [employer] when she was pregnant with her daughter, thus 33 years ago. Pt's wife is a  since then.     Disabled: yes: date disability began: , due to: back.    Monthly Income:   Disability: $1205  Able to afford all costs now and if transplanted, including medications: yes  Patient and Caregiver verbalizes understanding of personal responsibilities related to transplant costs and the importance of having a financial plan to ensure that patients transplant costs are fully covered.      provided fundraising information/education.  Patient and Caregiver verbalizes understanding.   remains available.    Insurance:   Payor/Plan Subscr  Sex Relation Sub. Ins. ID Effective Group Num   1. MEDICARE - [place] 1955 Male  [id number] 10/1/11                                    [address]   2. [facility] 1955 Male Self [id number]                                    [address] [address]     Primary Insurance (for UNOS reporting): Public Insurance - Medicare FFS (Fee For Service)  Secondary Insurance (for UNOS reporting): Public Insurance - [place]  [place]  Patient and Caregiver verbalizes clear understanding that patient may experience difficulty obtaining and/or be denied insurance coverage post-surgery. This includes and is not limited to disability insurance, life insurance, health insurance, burial insurance, long term care insurance, and other insurances.    Patient and Caregiver also reports understanding that future health concerns related to or unrelated to transplantation may not be covered by patient's insurance.  Resources and information provided and reviewed.      Patient and Caregiver provides verbal permission to release any necessary information to outside resources for patient care and discharge planning.  Resources and information provided are reviewed.      Dialysis Adherence:  Patient and Caregiver denies having any past or recent dialysis.     Infusion Service: patient utilizing? no  Home Health: patient utilizing? yes, [facility] for skilled nursing in the past for wound care.   DME: no, crutches, cane, walker, and bsc from previous surgery.   Pulmonary/Cardiac Rehab: None.    ADLS:  Pt and caregiver reported independent with ADLS, however can not walk long distances and carry heavy load. Pt reported does drive.     Adherence:   Caregiver and pt reported adherent and compliant to medical appointments and medication.  Adherence education and counseling provided.     Per History Section:  Past Medical History:   Diagnosis Date    Arthritis     Bladder cancer 2017    s/p TURBT    Chronic lower back pain     Diabetes     Hx of hepatitis C     Hyperlipemia     Upper back pain      Social History     Tobacco Use    Smoking status: Former Smoker     Quit date:      Years since quittin.5    Smokeless tobacco: Never Used   Substance Use Topics    Alcohol use: No     Frequency: Never     Social History     Substance and Sexual Activity   Drug Use No     Social History     Substance and Sexual Activity   Sexual Activity Not  on file       Per Today's Psychosocial:  Tobacco: none, patient denies any use. Pt and caregiver reported that both pt and his wife quit about 6 years ago. Patient stated that he smoked about a ppd for about 30-35 years.   Alcohol: none, patient denies any use. Pt reported quit drinking in year 2000. Prior to quitting, pt reported would drink a 12 pack a day with friends and occasionally moonshine.    Illicit Drugs/Non-prescribed Medications: none, patient denies any use.    Patient and Caregiver states clear understanding of the potential impact of substance use as it relates to transplant candidacy and is aware of possible random substance screening.  Substance abstinence/cessation counseling, education and resources provided and reviewed.     Arrests/DWI/Treatment/Rehab: patient denies    Psychiatric History:    Mental Health: depression and anxiety. Pt reported does have symptoms of anxiety and depression. Pt reported medication has been helping manage his symptoms of anxiety or depression.   Psychiatrist/Counselor: None.   Medications:  Valium and Celexa prescribed by pt's oncologist.   Suicide/Homicide Issues: Pt denies any past or recent suicidal/homicidal ideations.    Safety at home: Pt reported does reside in a safe space and environment at home.     Knowledge: Patient and Caregiver states having clear understanding and realistic expectations regarding the potential risks and potential benefits of organ transplantation and organ donation, agrees to discuss with health care team members and support system members and to utilize available resources and express questions and/or concerns in order to further facilitate the pt informed decision-making.  Resources and information provided and reviewed.     Patient and Caregiver is aware of [facility]'s affiliation and/or partnership with agencies in home health care, LTAC, SNF, [facility], and other hospitals and clinics.    Understanding: Patient and Caregiver reports  "having a clear understanding of the many lifetime commitments involved with being a transplant recipient, including costs, compliance, medications, lab work, procedures, appointments, concrete and financial planning, preparedness, timely and appropriate communication of concerns, abstinence (ETOH, tobacco, illicit non-prescribed drugs), adherence to all health care team recommendations, support system and caregiver involvement, appropriate and timely resource utilization and follow-through, mental health counseling as needed/recommended, and patient and caregiver responsibilities.  Social Service Handbook, resources and detailed educational information provided and reviewed.  Educational information provided.    Patient and Caregiver also reports current and expected compliance with health care regime and states having a clear understanding of the importance of compliance.      Patient and Caregiver reports a clear understanding that risks and benefits may be involved with organ transplantation and with organ donation.      Patient and Caregiver also reports clear understanding that psychosocial risk factors may affect patient, and include but are not limited to feelings of depression, generalized anxiety, anxiety regarding dependence on others, post traumatic stress disorder, feelings of guilt and other emotional and/or mental concerns, and/or exacerbation of existing mental health concerns.  Detailed resources provided and discussed.     Patient and Caregiver agrees to access appropriate resources in a timely manner as needed and/or as recommended, and to communicate concerns appropriately.  Patient and Caregiver also reports a clear understanding of treatment options available.      reviewed education, provided additional information, and answered questions.    Feelings or Concerns: Pt reported is "looking forward to it, I think it is a wonderful thing, it has saved my brother-in-law's life". Pt and " "caregiver are essentially accepting of transplant.     Coping: Pt reported coping well, pt's mental health symptoms are managed with medication, along with social support from family, friends, and Taoist. When SW inquired for pt's wife's coping, pt's wife was tearful and reported coping "Okay", stating main concern is wanting pt to be in good health.     Identify Patient & Caregiver Strategies to [place]:   1. Currently & Pre-transplant - Plays pool on his phone and watches Performance Indicator   2. At the time of surgery - Watch TV and Social support. Pt reported interest in seeing a jay during hospital stay.    3. During post-Transplant & Recovery Period - Pt reported same.      Goals: Pt reported goals post transplant are to watch his grandchildren grow.  Patient referred to Vocational Rehabilitation.    Interview Behavior: Patient and Caregiver presents as alert and oriented x 4, pleasant, good eye contact, well groomed, recall good, concentration/judgement good, average intelligence, calm, communicative, cooperative and asking and answering questions appropriately.          Transplant Social Work - Candidacy  Assessment/Plan:     Psychosocial Suitability: Patient presents as a suitable candidate for liver transplant at this time. Based on psychosocial risk factors, patient presents as low risk. Pt reported a monthly income of $1,200, however have not verbalized financial concerns to SW. Pt and caregiver reported have not had trouble affording medication due to adequate insurance coverage. Protective factors: Set and confirmed caregiver plan, adequate insurance and finances, no recent substance abuse concerns(maintained sobriety from EtOH for 30 years and tobacco 6 years), mental health symptoms of anxiety and depression managed with medication, great support system from family, friends, and spiritual support from local Taoist. SW advised pt and caregiver to financially plan and fundraise prior to transplant, pt and " caregiver reported hesitation towards fundraising due to avoiding losing insurance because of fundraising. SW informed pt and caregiver, for fundraising purposes pt and caregiver may start a medical fundraising account solely for the purposes of medical fundraising. Pt and caregiver verbalized understanding.     Recommendations/Additional Comments:   - Fundraising  -Local Lodging   -Continue anxiety and depression medication management with physician.   - Advance Diretives  - Financial planning ahead of time prior to transplant.       Cintia Chang LMSW

## 2020-07-09 ENCOUNTER — INFUSION (OUTPATIENT)
Dept: INFECTIOUS DISEASES | Facility: HOSPITAL | Age: 65
End: 2020-07-09
Attending: INTERNAL MEDICINE
Payer: MEDICARE

## 2020-07-09 ENCOUNTER — CLINICAL SUPPORT (OUTPATIENT)
Dept: TRANSPLANT | Facility: CLINIC | Age: 65
End: 2020-07-09
Payer: MEDICARE

## 2020-07-09 ENCOUNTER — HOSPITAL ENCOUNTER (OUTPATIENT)
Dept: RADIOLOGY | Facility: HOSPITAL | Age: 65
Discharge: HOME | End: 2020-07-09
Attending: INTERNAL MEDICINE
Payer: MEDICARE

## 2020-07-09 ENCOUNTER — OFFICE VISIT (OUTPATIENT)
Dept: TRANSPLANT | Facility: CLINIC | Age: 65
End: 2020-07-09
Payer: MEDICARE

## 2020-07-09 VITALS
BODY MASS INDEX: 24.98 KG/M2 | DIASTOLIC BLOOD PRESSURE: 64 MMHG | OXYGEN SATURATION: 99 % | SYSTOLIC BLOOD PRESSURE: 135 MMHG | HEIGHT: 69 IN | HEIGHT: 69 IN | OXYGEN SATURATION: 99 % | HEART RATE: 59 BPM | DIASTOLIC BLOOD PRESSURE: 64 MMHG | SYSTOLIC BLOOD PRESSURE: 135 MMHG | WEIGHT: 168.63 LBS | TEMPERATURE: 98 F | BODY MASS INDEX: 24.98 KG/M2 | TEMPERATURE: 98 F | HEART RATE: 59 BPM | WEIGHT: 168.63 LBS

## 2020-07-09 DIAGNOSIS — Z76.82 LIVER TRANSPLANT CANDIDATE: ICD-10-CM

## 2020-07-09 DIAGNOSIS — D49.0 LIVER NEOPLASM: ICD-10-CM

## 2020-07-09 DIAGNOSIS — K74.60 HEPATIC CIRRHOSIS, UNSPECIFIED HEPATIC CIRRHOSIS TYPE, UNSPECIFIED WHETHER ASCITES PRESENT: ICD-10-CM

## 2020-07-09 DIAGNOSIS — C22.0 HCC (HEPATOCELLULAR CARCINOMA): ICD-10-CM

## 2020-07-09 DIAGNOSIS — K73.9 CHRONIC HEPATITIS, UNSPECIFIED: ICD-10-CM

## 2020-07-09 DIAGNOSIS — C24.9 MALIGNANT NEOPLASM OF BILIARY TRACT, UNSPECIFIED: ICD-10-CM

## 2020-07-09 DIAGNOSIS — C76.8 MALIGNANT NEOPLASM OF OTHER SPECIFIED ILL-DEFINED SITES: ICD-10-CM

## 2020-07-09 DIAGNOSIS — K74.69 OTHER CIRRHOSIS OF LIVER: ICD-10-CM

## 2020-07-09 LAB
AMPHET+METHAMPHET UR QL: NEGATIVE
BARBITURATES UR QL SCN>200 NG/ML: NEGATIVE
BENZODIAZ UR QL SCN>200 NG/ML: NORMAL
BZE UR QL SCN: NEGATIVE
CANNABINOIDS UR QL SCN: NORMAL
CREAT UR-MCNC: 132 MG/DL (ref 23–375)
ETHANOL UR-MCNC: <10 MG/DL
METHADONE UR QL SCN>300 NG/ML: NEGATIVE
OPIATES UR QL SCN: NORMAL
PCP UR QL SCN>25 NG/ML: NEGATIVE
TOXICOLOGY INFORMATION: NORMAL

## 2020-07-09 PROCEDURE — 71046 XR CHEST PA AND LATERAL: ICD-10-PCS | Mod: 26,TXP,, | Performed by: RADIOLOGY

## 2020-07-09 PROCEDURE — 71046 X-RAY EXAM CHEST 2 VIEWS: CPT | Mod: 26,TXP,, | Performed by: RADIOLOGY

## 2020-07-09 PROCEDURE — 99999 PR PBB SHADOW E&M-EST. PATIENT-LVL II: CPT | Mod: PBBFAC,TXP,,

## 2020-07-09 PROCEDURE — 80307 DRUG TEST PRSMV CHEM ANLYZR: CPT | Mod: TXP

## 2020-07-09 PROCEDURE — 74160 CT ABDOMEN W/CONTRAST: CPT | Mod: 26,TXP,, | Performed by: INTERNAL MEDICINE

## 2020-07-09 PROCEDURE — 71046 X-RAY EXAM CHEST 2 VIEWS: CPT | Mod: TC,TXP

## 2020-07-09 PROCEDURE — 99999 PR PBB SHADOW E&M-EST. PATIENT-LVL III: ICD-10-PCS | Mod: PBBFAC,TXP,, | Performed by: INTERNAL MEDICINE

## 2020-07-09 PROCEDURE — 99214 PR OFFICE/OUTPT VISIT, EST, LEVL IV, 30-39 MIN: ICD-10-PCS | Mod: S$PBB,TXP,, | Performed by: INTERNAL MEDICINE

## 2020-07-09 PROCEDURE — 71250 CT THORAX DX C-: CPT | Mod: TC,TXP

## 2020-07-09 PROCEDURE — 99999 PR PBB SHADOW E&M-EST. PATIENT-LVL II: ICD-10-PCS | Mod: PBBFAC,TXP,,

## 2020-07-09 PROCEDURE — 71250 CT CHEST WITHOUT CONTRAST: ICD-10-PCS | Mod: 26,TXP,, | Performed by: RADIOLOGY

## 2020-07-09 PROCEDURE — 71250 CT THORAX DX C-: CPT | Mod: 26,TXP,, | Performed by: RADIOLOGY

## 2020-07-09 PROCEDURE — 99999 PR PBB SHADOW E&M-EST. PATIENT-LVL III: CPT | Mod: PBBFAC,TXP,, | Performed by: INTERNAL MEDICINE

## 2020-07-09 PROCEDURE — 99212 OFFICE O/P EST SF 10 MIN: CPT | Mod: PBBFAC,25,TXP

## 2020-07-09 PROCEDURE — 74160 CT ABDOMEN W/CONTRAST: CPT | Mod: TC,TXP

## 2020-07-09 PROCEDURE — 25500020 PHARM REV CODE 255: Mod: TXP | Performed by: INTERNAL MEDICINE

## 2020-07-09 PROCEDURE — 99213 OFFICE O/P EST LOW 20 MIN: CPT | Mod: PBBFAC,25,27,TXP | Performed by: INTERNAL MEDICINE

## 2020-07-09 PROCEDURE — 99214 OFFICE O/P EST MOD 30 MIN: CPT | Mod: S$PBB,TXP,, | Performed by: INTERNAL MEDICINE

## 2020-07-09 PROCEDURE — 74160 CT ABDOMEN WITH CONTRAST: ICD-10-PCS | Mod: 26,TXP,, | Performed by: INTERNAL MEDICINE

## 2020-07-09 RX ADMIN — IOHEXOL 100 ML: 350 INJECTION, SOLUTION INTRAVENOUS at 05:07

## 2020-07-09 NOTE — LETTER
July 9, 2020        Gurpreet Hernandez  [address]  [facility]  [place] [address]  Phone: [phone]  Fax: [phone]             [facility] Liver Transplant  [address]  [facility] [address]  Phone: [phone]   Patient: Brandon Syed   MR Number: [id number]   YOB: 1955   Date of Visit: 7/9/2020       Dear Dr. Gurpreet Hernandez    Thank you for referring Brandon Syed to me for evaluation. Attached you will find relevant portions of my assessment and plan of care.    If you have questions, please do not hesitate to call me. I look forward to following Brandon Syed along with you.    Sincerely,    Ricardo Younger MD    Enclosure    If you would like to receive this communication electronically, please contact [email or web address] or [phone] to request [employer] Link access.    [employer] Link is a tool which provides read-only access to select patient information with whom you have a relationship. Its easy to use and provides real time access to review your patients record including encounter summaries, notes, results, and demographic information.    If you feel you have received this communication in error or would no longer like to receive these types of communications, please e-mail [email or web address]

## 2020-07-09 NOTE — PROGRESS NOTES
Pt arrived from CT for PIV placement.  #20g to right hand for CT scan complete.  Assisted to 2nd floor imaging with wife at side for test, tolerated well.

## 2020-07-09 NOTE — PROGRESS NOTES
PRE EDUCATION TEACHING NOTE    Brandon Syed present in clinic today for patient education.  Handbook on pre-liver transplant information (see outline below) was given to the patient.  Per clinic staff, patient viewed pre-liver transplant education slides via desktop in transplant clinic.  Informed consent signed and written information given on selection criteria.    LIVER TRANSPLANT WORK-UP EDUCATION   I. UNDERSTANDING THE TRANSPLANT PROCESS     A. Transplant team      B. MELD score      C. Balancing urgency and outcome     D. Liver Transplant Options         1.  Donor         2. Living Donor--rationale, benefits     E. Transplant Work-up         1. Medical         2. Psychological and Social--lifetime commitment, life changes, personal plan/ goal         3. Financial--fundraising     F.  Completed work-up and Next Steps    G. Wait Time         1.  Can be listed at more than one center         2.  Can transfer wait time     H. The Call       I. Possible donor options         1. DCD         2. Hep B Core and Hep C Positive         3. Increased Risk     J.  Liver Transplant Surgery         1. Length         2. Transfusions, cell saver         3. Surgical risks         4. What to expect after sugery--Central lines, drains, Davis catheter, incision, endotracheal              tube, NG tube, length of stay in ICU/ TSU  II.  HOW TO BEST CARE FOR YOURSELF (Take Five To Thrive)  III. UNDERSTANDING LIFE AFTER TRANSPLANT  A. Medicines after transplant      1. Immunosuppression--infection and rejection  B. Labs   IV. ADULT LIVER SURVIVAL RATES

## 2020-07-09 NOTE — PROGRESS NOTES
Subjective:       Patient ID: Brandon Syed is a 64 y.o. male.    Chief Complaint: Liver Transplant Follow-up    HPI  I saw this 64 y.o. man who was first seen in Aug 2018 for evaluation of presumed HCC.  He has a h/o HCV cirrhosis, s/p treatment with IFN+RBV and victralis with SVR.     He has a h/o a subcentimeter liver mass, followed by serial imaging. In 02/2018  mass noted to be 24 mm and AFP was 13.8. Mass was biopsied no evidence of HCC; benign liver parenchyma with cirrhosis and moderate steatosis.    Pt reported severe abdominal pain starting 07/2018 accompanied by fatigue. He went to follow up and AFP repeated and had increased to 6360.      MRI 08/20/18 and a mass measuring 7.2x7.2x5.7 noted wih peripheral enhancement and pseudocapsule felt to be HCC. Furthermore, hepatic dome may be contiguous with the aforementioned mass and a second lesion may have coalesced with larger mass.       He has a h/o bladder carcinoma, noted 07/2017, s/p TURBT.  Last cystoscopy on Jan 6 2020- all clear (Dr Hanny Keller)     Further PMH of HLD and DMII.     AFP 95368 on 8/29/2018  AFP 1.4 on 9/3/2019    IR conference review:  Pulmonary nodules on CT are non specific. Rec continued surveillance. Fleischer guidelines rec f/u in 6-12 months. MRI shows 8.1 cm heterogeneous mass in segments 5/9 and possibly extending to segment 7. Theres associated PVT. Appearance suggests infiltrative process which given AFP is favored to be HCC. Rec Y90.    Y90 on 10/18/18    He was followed with Dr Lora (Oncology, [facility])  - on lenvatinib    IR conference review: 6/11/2019  POSSIBLE RIGHT PV THROMBUS, NO ENHANCEMENT SEEN, NO DISEASE BURDEN SEEN ON IMAGING     CT chest/abdo: 6/`18/2019  1. In this patient with with a history of hepatocellular carcinoma, there is redemonstration of mass in hepatic segment VIII demonstrating appearance consistent with post treatment change.  No new hepatic lesions identified.  2. Postsurgical  changes aorto bi-iliac endovascular stent placement.  3. Splenomegaly.  4. Clustered micro nodules within the left upper lobe suggesting small airways or small vessels disease.  5. Additional findings as above.    Since then he was evaluated and was listed for transplantation on 9/30/2019 given that he was down staged to within [place] criteria.    -repeat imaging today      PMH:  aortofemoral bypass- 2016    SH:  Ex smoker- stopped 2014 ( 40 year pack smoker)    Review of Systems   Constitutional: Negative for activity change, appetite change, chills, fatigue, fever and unexpected weight change.   HENT: Negative for hearing loss.    Eyes: Negative for discharge and visual disturbance.   Respiratory: Negative for cough, chest tightness, shortness of breath and wheezing.    Cardiovascular: Negative for chest pain, palpitations and leg swelling.   Gastrointestinal: Negative for abdominal distention, abdominal pain, constipation, diarrhea and nausea.   Genitourinary: Negative for dysuria and frequency.   Musculoskeletal: Negative for arthralgias and back pain.   Skin: Negative for pallor and rash.   Neurological: Negative for dizziness, tremors, speech difficulty and headaches.   Hematological: Negative for adenopathy.   Psychiatric/Behavioral: Negative for agitation and confusion.            Lab Results   Component Value Date    ALT 18 07/09/2020    AST 37 07/09/2020     (H) 07/25/2019    ALKPHOS 168 (H) 07/09/2020    BILITOT 0.7 07/09/2020     Past Medical History:   Diagnosis Date    Arthritis     Bladder cancer 2017    s/p TURBT    Chronic lower back pain     Diabetes     Hx of hepatitis C     Hyperlipemia     Upper back pain      Past Surgical History:   Procedure Laterality Date    BLADDER SURGERY      TURBT    FEMORAL BYPASS       Current Outpatient Medications   Medication Sig    clindamycin phosphate 1% (CLINDAGEL) 1 % gel     DEXILANT 60 mg capsule Take 60 mg by mouth once daily.    diazePAM  (VALIUM) 5 MG tablet Take 5 mg by mouth 2 (two) times daily.     fluticasone propionate (FLONASE) 50 mcg/actuation nasal spray by Each Nostril route as needed.    lenvatinib (LENVIMA) 4 mg Cap Take by mouth once daily.    levothyroxine (SYNTHROID) 75 MCG tablet     levothyroxine (SYNTHROID) 88 MCG tablet Take 88 mcg by mouth once daily.     lovastatin (MEVACOR) 20 MG tablet Take 20 mg by mouth every evening.     metFORMIN (GLUCOPHAGE-XR) 500 MG 24 hr tablet Take 500 mg by mouth as needed.     oxyCODONE (ROXICODONE) 20 mg Tab immediate release tablet Take 20 mg by mouth every 4 (four) hours as needed.    promethazine (PHENERGAN) 25 MG tablet Take 25 mg by mouth every 6 (six) hours as needed.    TRUE METRIX GLUCOSE TEST STRIP Strp CHECK BLOOD GLUCOSE ONCE DAILY     No current facility-administered medications for this visit.      Facility-Administered Medications Ordered in Other Visits   Medication    0.9%  NaCl infusion    heparin infusion 1,000 units/500 ml in 0.9% NaCl (pressure line flush)       Objective:    NOT DONE- VIDEO VISIT      Assessment:       1. HCC (hepatocellular carcinoma)    2. Liver transplant candidate    3. Hepatic cirrhosis, unspecified hepatic cirrhosis type, unspecified whether ascites present         Plan:   HCV cirrhosis with advanced HCC,  He responded well to Y90 given in Oct 2018 and he is considered down-staged and suitable for listing for liver Tx.  Active on list since Sep 2019 with a MELD of 6.    He maintains an excellent functional status. Today he described some relatively new RUQ pain  - scans later today      Clinic in 3 months.

## 2020-07-13 ENCOUNTER — TELEPHONE (OUTPATIENT)
Dept: TRANSPLANT | Facility: CLINIC | Age: 65
End: 2020-07-13

## 2020-07-13 NOTE — TELEPHONE ENCOUNTER
Returned call to Rebekah Pt's wife, reviewed echo stress and US results along with labs from last week. Wife request pt test results be sent to pt's oncologist Liset Lora at [facility] [phone] for review and not to repeat the same testing.  Information given to Jada for follow up. Informed Pt's wife a message will be sent to pt's coordinator to contact them for further test result review. Wife states understanding.

## 2020-07-14 ENCOUNTER — TELEPHONE (OUTPATIENT)
Dept: TRANSPLANT | Facility: CLINIC | Age: 65
End: 2020-07-14

## 2020-07-14 ENCOUNTER — CONFERENCE (OUTPATIENT)
Dept: TRANSPLANT | Facility: CLINIC | Age: 65
End: 2020-07-14

## 2020-07-14 NOTE — TELEPHONE ENCOUNTER
Added for discussion at Tuesday's noon meeting    ----- Message from Ricardo Younger MD sent at 7/10/2020  2:39 PM CDT -----  Shall we discuss him at Tuesday lunchtime?    ----- Message -----  From: Olga Basurto  Sent: 7/10/2020   2:16 PM CDT  To: Ricardo Younger MD    Exception request was denied---    ....AFP on 6360.52 on 8/14/18. This was evaluated by MR on 8/20/18 which revealed an 8.1 cm lesion demonstrating enhancement, washout, and pseudocapsule and questionable right anterior portal vein thrombus. A repeat AFP on 8/29/18 peaked at [address]112. Radioembolization was performed 10/18/18. A post treatment MRI on 1/21/19 demonstrated a 4.4 cm with post treatment changes with no evidence of residual disease. An AFP on 6/18/19 was 1.3. Doppler evaluation of the hepatic and portal venous systems on 7/25/19 demonstrated patent vessels.  _________________________________________________  RRB Comments:    ~The patient was outside of even [facility] with a very high AFP. Overall, high risk for early HCC Recurrence.    ~Per policy this patient was not eligible for downstaging. The relevant policy language states: Candidates are eligible for a standardized MELD or PELD exception if, before completing local-regional therapy, they have lesions that meet one of the following criteria: 1. One lesion greater than 5 cm and less than or equal to 8 cm  ----- Message -----  From: Ricardo Younger MD  Sent: 7/9/2020   4:45 PM CDT  To: [facility] Pre-Liver Transplant Clinical    Olga, does he have exception points yet?

## 2020-07-15 ENCOUNTER — TELEPHONE (OUTPATIENT)
Dept: TRANSPLANT | Facility: CLINIC | Age: 65
End: 2020-07-15

## 2020-07-15 NOTE — TELEPHONE ENCOUNTER
Call returned with no answer. Voice message left, requesting a return call as needed. Contact numbers provided.     ----- Message from Sol Machuca sent at 7/15/2020  2:05 PM CDT -----  Regarding: results  Contact: ramsey Syed  Calling about lab results        Call back :[phone]

## 2020-07-15 NOTE — TELEPHONE ENCOUNTER
TXP LIVER COORDINATOR LIVER DISCUSSION CONFERENCE     Brandon Syed's  case was discussed at liver discussion conference.     Discussion/Plan: Original tumor burden beyond Alexei and [facility] criteria for downstagingl; 8.1 cm lesion, AFP peaked at >23,000. Last LRT Oct 2018. Downstaged with no recurrent since Jan 2019 (post treatment imaging demonstrated 4.4 cm lesion with normalization of AFP).     Listed for >9 months with medical MELD score d/t initial MELD exception declined d/t above. Ok to resubmit MELD exception based on stable liver disease with no recurrent tumor for >17 months.

## 2020-07-16 ENCOUNTER — TELEPHONE (OUTPATIENT)
Dept: TRANSPLANT | Facility: CLINIC | Age: 65
End: 2020-07-16

## 2020-07-16 NOTE — TELEPHONE ENCOUNTER
Call returned with no answer.  Voice message left requesting a return call as needed.  Contact numbers provided.     ----- Message from Jennifer Duran sent at 7/16/2020 10:36 AM CDT -----  Regarding: Missed call  Contact: Mendy  Reason: Returning call    Communication: [phone]

## 2020-07-16 NOTE — TELEPHONE ENCOUNTER
Patient: Brandon Syed       MRN: [id number]      : 1955     Age: 64 y.o.  [address]  [place] [address]    Provider: Hepatologist - Carisa/James    Urgency of review: non-urgent    Patient Transplant Status: Listed    Reason for presentation: Reassessment    Clinical Summary: 65 yo w/ h/o HCV cirrhosis, s/p treatment with IFN+RBV and victreles with SVR. He has a h/o a subcentimeter liver mass, followed by serial imaging. In 2018  mass noted to be 24 mm and AFP was 13.8. Mass was biopsied no evidence of HCC; benign liver parenchyma with cirrhosis and moderate steatosis. Pt reported severe abdominal pain starting 2018 accompanied by fatigue. He went to follow up and AFP repeated and had increased to 6360.      U/S from  notes a 1.7 cm mass, 9.8x7.0x7.8 cm mass and 2 cm mass not visualized. Pt had MRI  and a mass measuring 7.2x7.2x5.7 (8.1 cm p/IR review) noted wih peripheral enhancement and pseudocapsule felt to be HCC. Furthermore, hepatic dome may be contiguous with the aforementioned mass and a second lesion may have coalesced with larger mass. AFP peaked at >23,000.    Imaging to be reviewed: CT chest/abd 20    HCC Treatment History: Y90 10/18/18    ABO: O NEG    Platelets:   Lab Results   Component Value Date/Time    PLT 99 (L) 2020 08:22 AM    EXTPLATELETS 52 2020 12:41 PM     Creatinine:   Lab Results   Component Value Date/Time    CREATININE 0.9 2020 08:22 AM    EXTCREATININ 1.0 2020 12:41 PM     Bilirubin:   Lab Results   Component Value Date/Time    BILITOT 0.7 2020 08:22 AM    EXTBILITOTAL 0.8 2020 12:41 PM     AFP Last 3 each if available:   Lab Results   Component Value Date/Time    AFP 0.7 2020 08:22 AM    AFP 1.4 2019 11:02 AM    AFP 1.3 2019 09:02 AM    EXTAFP 1.2 2020 12:41 PM       MELD: MELD-Na score: 6 at 2020  8:22 AM  MELD score: 6 at 2020  8:22 AM  Calculated from:  Serum Creatinine:  0.9 mg/dL (Rounded to 1 mg/dL) at 7/9/2020  8:22 AM  Serum Sodium: 141 mmol/L (Rounded to 137 mmol/L) at 7/9/2020  8:22 AM  Total Bilirubin: 0.7 mg/dL (Rounded to 1 mg/dL) at 7/9/2020  8:22 AM  INR(ratio): 1.0 at 7/9/2020  8:22 AM  Age: 64 years 6 months    Plan: CT 7/2020 with post treatment changes in segment 8. No evidence for new lesion or recurrent disease at the treatment site. Plan: tumor surveillance in 3 months. Will resubmit for MELD exception.      Follow-up Provider: Ricardo Younger MD

## 2020-07-21 ENCOUNTER — CONFERENCE (OUTPATIENT)
Dept: TRANSPLANT | Facility: CLINIC | Age: 65
End: 2020-07-21

## 2020-08-07 ENCOUNTER — TELEPHONE (OUTPATIENT)
Dept: TRANSPLANT | Facility: CLINIC | Age: 65
End: 2020-08-07

## 2020-08-07 NOTE — TELEPHONE ENCOUNTER
Call returned. Mendy says Brandon has a follow-up with Dr. Hernandez next week and they are requesting results of his most recent COVID test. Advised COVID test was done May 30th. Will fax as requested. Understanding expressed.    Mendy says Brandon was admitted to his local hospital about a week ago with c/o right side pain and delirium. Says he was diagnosed with pneumonia. Discharged 7/31 on home O2 (2L/nc) and antibiotics. Says he's doing much better now; has ~3-4 days more of ABX; and home O2 levels have been ranging b/t 94-95%. Says he has a follow-up with Dr. Lora on Aug 12th.     ----- Message from Piedad Swenson sent at 8/7/2020  9:37 AM CDT -----  Mendy pt wife#[phone] or  [phone]    She states that his gastro dr is requesting a copy of his covid19 results and she wants to know if you can fax it. She states that they need it for Monday   Fax#[phone]

## 2020-08-10 DIAGNOSIS — C22.0 HCC (HEPATOCELLULAR CARCINOMA): Primary | ICD-10-CM

## 2020-08-10 DIAGNOSIS — Z76.82 LIVER TRANSPLANT CANDIDATE: ICD-10-CM

## 2020-08-10 DIAGNOSIS — K74.69 OTHER CIRRHOSIS OF LIVER: ICD-10-CM

## 2020-08-10 NOTE — PROGRESS NOTES
Orders entered and appointment card completed for clinic follow-up in 3 months (Oct 2020) with labs and surveillance CT chest/abd

## 2020-08-11 ENCOUNTER — TELEPHONE (OUTPATIENT)
Dept: TRANSPLANT | Facility: CLINIC | Age: 65
End: 2020-08-11

## 2020-08-11 NOTE — TELEPHONE ENCOUNTER
JANICE/JANICE Syed notified of result and request to have order to [facility] faxed to Dr. Lora's office. Order faxed to [phone]    ----- Message from Ricardo Younger MD sent at 7/13/2020 11:10 AM CDT -----  Results reviewed.  t spot please

## 2020-08-17 ENCOUNTER — TELEPHONE (OUTPATIENT)
Dept: HEPATOLOGY | Facility: CLINIC | Age: 65
End: 2020-08-17

## 2020-08-17 NOTE — TELEPHONE ENCOUNTER
Phone call returned to Manisha louis/ Dr. Lora's office.  She is away from her desk.  Left message w/  stating that her message regarding patient received and returning her call.  Requested call back.  Contact info provided.

## 2020-08-17 NOTE — TELEPHONE ENCOUNTER
Bigg w/ Manisha louis/ Dr. Liset Lora's office (Medical Oncologist in [place], [place]).  Dr. Lora is requesting a callback on her cell from pt's primary hepatologist to discuss a new treatment for patient and if there are any contraindications r/t transplant candidacy.  No further info provided.  Will provide Dr. Younger w/ Dr. Lora's cell number to contact her as requested.

## 2020-08-17 NOTE — TELEPHONE ENCOUNTER
----- Message from Adriana Chavarria sent at 8/17/2020 12:28 PM CDT -----  Regarding: PT  Contact: Manisha with Dr Lora  Called bc Dr Lora wanted to speak to the doctor regarding the PT. Please call back     Callback: [phone] - the number will get you to their call center, just say that Manisha is expecting your call

## 2020-08-21 ENCOUNTER — TELEPHONE (OUTPATIENT)
Dept: TRANSPLANT | Facility: CLINIC | Age: 65
End: 2020-08-21

## 2020-08-21 NOTE — TELEPHONE ENCOUNTER
JANICE/JANICE Villasenor informed patient's listing status will be changed to Inactive pending review of current MRI and recovery from surgery. Advised he will remain on the liver transplant waitlist but will be ineligible for organ offers. Understanding expressed. Mrs Syed says Dr. Lora's office should be able to assist with getting the clinicals faxed and the mri disc can be requested from [facility].    Message forwarded to  to change listing status to inactive.

## 2020-09-01 ENCOUNTER — TELEPHONE (OUTPATIENT)
Dept: TRANSPLANT | Facility: CLINIC | Age: 65
End: 2020-09-01

## 2020-09-01 NOTE — TELEPHONE ENCOUNTER
Had to request patient radiology flim from [facility] [place] they will overnight images for the second time

## 2020-09-02 LAB
EXT ABO: NORMAL
EXT AFP: 0.9
EXT ALBUMIN: 2.3
EXT ALKALINE PHOSPHATASE: 378
EXT ALT: 16
EXT AST: 37
EXT BILIRUBIN TOTAL: 0.5
EXT BUN: 8
EXT CALCIUM: 7.6
EXT CHLORIDE: 102
EXT CO2: 28
EXT CREATININE: 1.1 MG/DL
EXT FERRITIN: 431.12
EXT GLUCOSE: 102
EXT HEMATOCRIT: 32
EXT HEMOGLOBIN: 9.7
EXT IRON SATURATION: 14
EXT PLATELETS: 166
EXT POTASSIUM: 3.6
EXT PROTEIN TOTAL: 7.2
EXT SODIUM: 138 MMOL/L
EXT TIBC: 133
EXT WBC: 11.43

## 2020-09-08 ENCOUNTER — CONFERENCE (OUTPATIENT)
Dept: TRANSPLANT | Facility: CLINIC | Age: 65
End: 2020-09-08

## 2020-09-08 ENCOUNTER — TELEPHONE (OUTPATIENT)
Dept: TRANSPLANT | Facility: CLINIC | Age: 65
End: 2020-09-08

## 2020-09-08 NOTE — TELEPHONE ENCOUNTER
Patient called and informed of the recommendations of IR review, spoke with the patient's wife who stated that the patient is scheduled to have imaging in a few days.  Spoke with Manisha at Dr Lora's office about  The need for additional imaging.and labs.   Patient scheduled for imaging on 9/115 and recent labs sent from the office.

## 2020-09-08 NOTE — TELEPHONE ENCOUNTER
Patient: Brandon Syed       MRN: [id number]      : 1955     Age: 64 y.o.  [address]  [place] [address]     Provider: Hepatologist - Sina     Urgency of review: non-urgent     Patient Transplant Status: Listed (Inactive)     Reason for presentation: Reassessment     Clinical Summary: 64 year old w/ h/o HCV cirrhosis, s/p treatment with IFN+RBV and victreles with SVR. Recently developed an empyema. Admitted to OSH and underwent surgical procedure with pleurex catheter insertion. Normal AP and negative cytology. Imaging suggestive of tumor progression but may be infection.     He has a h/o a subcentimeter liver mass, followed by serial imaging. In 2018  mass noted to be 24 mm and AFP was 13.8. Mass was biopsied no evidence of HCC; benign liver parenchyma with cirrhosis and moderate steatosis. Pt reported severe abdominal pain starting 2018 accompanied by fatigue. He went to follow up and AFP repeated and had increased to 6360.      U/S from  notes a 1.7 cm mass, 9.8x7.0x7.8 cm mass and 2 cm mass not visualized. Pt had MRI  and a mass measuring 7.2x7.2x5.7 (8.1 cm p/IR review) noted wih peripheral enhancement and pseudocapsule felt to be HCC. Furthermore, hepatic dome may be contiguous with the aforementioned mass and a second lesion may have coalesced with larger mass. AFP peaked at >23,000.     Imaging to be reviewed: CT 2020 and 2020     HCC Treatment History: Yttrium 10/18/18     ABO: O NEG     Platelets:         Lab Results   Component Value Date/Time     PLT 99 (L) 2020 08:22 AM     EXTPLATELETS 52 2020 12:41 PM      Creatinine:         Lab Results   Component Value Date/Time     CREATININE 0.9 2020 08:22 AM     EXTCREATININ 1.0 2020 12:41 PM      Bilirubin:         Lab Results   Component Value Date/Time     BILITOT 0.7 2020 08:22 AM     EXTBILITOTAL 0.8 2020 12:41 PM      AFP Last 3 each if available:         Lab Results    Component Value Date/Time     AFP 0.7 07/09/2020 08:22 AM     AFP 1.4 09/03/2019 11:02 AM     AFP 1.3 06/18/2019 09:02 AM     EXTAFP 1.2 04/14/2020 12:41 PM         MELD: MELD-Na score: 6 at 7/9/2020  8:22 AM  MELD score: 6 at 7/9/2020  8:22 AM  Calculated from:  Serum Creatinine: 0.9 mg/dL (Rounded to 1 mg/dL) at 7/9/2020  8:22 AM  Serum Sodium: 141 mmol/L (Rounded to 137 mmol/L) at 7/9/2020  8:22 AM  Total Bilirubin: 0.7 mg/dL (Rounded to 1 mg/dL) at 7/9/2020  8:22 AM  INR(ratio): 1.0 at 7/9/2020  8:22 AM  Age: 64 years 6 months     Plan: Neither CT is TPCT. CT 7/2020 shows a hypodense lesion in segment 5 of the liver. The lesion appears treated. There is however, an area of washout in segment 6 that's indeterminate. CT 8/2020 shows development of a loculated pleural effusion on the right and mild amount of fluid surrounding liver of uncertain etiology. Additional indeterminate areas of washout noted in segment 6. Rec repeat AFP and MRI now to evaluate for new lesions in the liver.    Not a triple phased imaging.    Area of nodularity and washout.  Indeterminate.  Needs MR and AFP.     Follow-up Provider: Dr Younger

## 2020-10-06 ENCOUNTER — TELEPHONE (OUTPATIENT)
Dept: TRANSPLANT | Facility: CLINIC | Age: 65
End: 2020-10-06

## 2020-10-06 NOTE — TELEPHONE ENCOUNTER
Pt wife explain pt had labs and imaging done locally pt is not need of any imaging or labs done to call dr Carole Umanzor at [phone] to request results before doctor visit

## 2020-10-07 ENCOUNTER — OFFICE VISIT (OUTPATIENT)
Dept: TRANSPLANT | Facility: CLINIC | Age: 65
End: 2020-10-07
Payer: MEDICARE

## 2020-10-07 DIAGNOSIS — C22.0 HCC (HEPATOCELLULAR CARCINOMA): ICD-10-CM

## 2020-10-07 DIAGNOSIS — K74.69 OTHER CIRRHOSIS OF LIVER: ICD-10-CM

## 2020-10-07 DIAGNOSIS — Z76.82 LIVER TRANSPLANT CANDIDATE: ICD-10-CM

## 2020-10-07 PROCEDURE — 99213 OFFICE O/P EST LOW 20 MIN: CPT | Mod: 95,TXP,, | Performed by: INTERNAL MEDICINE

## 2020-10-07 PROCEDURE — 99213 PR OFFICE/OUTPT VISIT, EST, LEVL III, 20-29 MIN: ICD-10-PCS | Mod: 95,TXP,, | Performed by: INTERNAL MEDICINE

## 2020-10-07 NOTE — LETTER
October 8, 2020        Gurpreet Hernandez  [address] [facility]  [facility]  [place] [address]  Phone: [phone]  Fax: [phone]     Liset Lora  [address] [facility]  [address]  [place] [address]  Phone: [phone]  Fax: [phone]             [facility]  [address]  [place] [address]  Phone: [phone]   Patient: Brandon Syed   MR Number: [id number]   YOB: 1955   Date of Visit: 10/7/2020       Dear Dr. Gurpreet Hernandez, Liset Lora    Thank you for referring Brandon Syed to me for evaluation. Attached you will find relevant portions of my assessment and plan of care.    If you have questions, please do not hesitate to call me. I look forward to following Brandon Syed along with you.    Sincerely,    Ricardo Younger MD    Enclosure    If you would like to receive this communication electronically, please contact [email or web address] or [phone] to request [employer] access.    [employer] is a tool which provides read-only access to select patient information with whom you have a relationship. Its easy to use and provides real time access to review your patients record including encounter summaries, notes, results, and demographic information.    If you feel you have received this communication in error or would no longer like to receive these types of communications, please e-mail [email or web address]

## 2020-10-07 NOTE — PROGRESS NOTES
Subjective:       Patient ID: Brandon Syed is a 64 y.o. male.    Chief Complaint: No chief complaint on file.  The patient location is: Home  The chief complaint leading to consultation is: Waitlist maintenance    Visit type: audiovisual    Face to Face time with patient: 20 minutes of total time spent on the encounter, which includes face to face time and non-face to face time preparing to see the patient (eg, review of tests), Obtaining and/or reviewing separately obtained history, Documenting clinical information in the electronic or other health record, Independently interpreting results (not separately reported) and communicating results to the patient/family/caregiver, or Care coordination (not separately reported).     Each patient to whom he or she provides medical services by telemedicine is:  (1) informed of the relationship between the physician and patient and the respective role of any other health care provider with respect to management of the patient; and (2) notified that he or she may decline to receive medical services by telemedicine and may withdraw from such care at any time.    Notes:     HPI  I saw this 64 y.o. man who was first seen in Aug 2018 for evaluation of presumed HCC.  He has a h/o HCV cirrhosis, s/p treatment with IFN+RBV and victralis with SVR.     He has a h/o a subcentimeter liver mass, followed by serial imaging. In 02/2018  mass noted to be 24 mm and AFP was 13.8. Mass was biopsied no evidence of HCC; benign liver parenchyma with cirrhosis and moderate steatosis.    Pt reported severe abdominal pain starting 07/2018 accompanied by fatigue. He went to follow up and AFP repeated and had increased to 6360.      MRI 08/20/18 and a mass measuring 7.2x7.2x5.7 noted wih peripheral enhancement and pseudocapsule felt to be HCC. Furthermore, hepatic dome may be contiguous with the aforementioned mass and a second lesion may have coalesced with larger mass.       He has a h/o bladder  carcinoma, noted 07/2017, s/p TURBT.  Last cystoscopy on Jan 6 2020- all clear (Dr Hanny Keller)     Further PMH of HLD and DMII.     AFP 39603 on 8/29/2018  AFP 1.4 on 9/3/2019  AFP 0.7 on 7/9/2020    Recent issues with an empyema- IV and now oral antibiotics + surgical management with thoracotomy  - discussed with Dr Lora  - no malignancy found- extensive tests including cytology and pleural biopsy    Y90 on 10/18/18    He was followed with Dr Lora (Oncology, [facility])  - on lenvatinib- treatment interrupted after empyema was discovered.    Since then he was evaluated and was listed for transplantation on 9/30/2019 given that he was down staged to within Alexei criteria.    PMH:  aortofemoral bypass- 2016    SH:  Ex smoker- stopped 2014 ( 40 year pack smoker)    Review of Systems   Constitutional: Negative for activity change, appetite change, chills, fatigue, fever and unexpected weight change.   HENT: Negative for hearing loss.    Eyes: Negative for discharge and visual disturbance.   Respiratory: Negative for cough, chest tightness, shortness of breath and wheezing.    Cardiovascular: Negative for chest pain, palpitations and leg swelling.   Gastrointestinal: Negative for abdominal distention, abdominal pain, constipation, diarrhea and nausea.   Genitourinary: Negative for dysuria and frequency.   Musculoskeletal: Negative for arthralgias and back pain.   Skin: Negative for pallor and rash.   Neurological: Negative for dizziness, tremors, speech difficulty and headaches.   Hematological: Negative for adenopathy.   Psychiatric/Behavioral: Negative for agitation and confusion.            Lab Results   Component Value Date    ALT 18 07/09/2020    AST 37 07/09/2020     (H) 07/25/2019    ALKPHOS 168 (H) 07/09/2020    BILITOT 0.7 07/09/2020     Past Medical History:   Diagnosis Date    Arthritis     Bladder cancer 2017    s/p TURBT    Chronic lower back pain     Diabetes     Hx of  hepatitis C     Hyperlipemia     Upper back pain      Past Surgical History:   Procedure Laterality Date    BLADDER SURGERY      TURBT    FEMORAL BYPASS       Current Outpatient Medications   Medication Sig    clindamycin phosphate 1% (CLINDAGEL) 1 % gel     DEXILANT 60 mg capsule Take 60 mg by mouth once daily.    diazePAM (VALIUM) 5 MG tablet Take 5 mg by mouth 2 (two) times daily.     fluticasone propionate (FLONASE) 50 mcg/actuation nasal spray by Each Nostril route as needed.    lenvatinib (LENVIMA) 4 mg Cap Take by mouth once daily.    levothyroxine (SYNTHROID) 75 MCG tablet     levothyroxine (SYNTHROID) 88 MCG tablet Take 88 mcg by mouth once daily.     lovastatin (MEVACOR) 20 MG tablet Take 20 mg by mouth every evening.     metFORMIN (GLUCOPHAGE-XR) 500 MG 24 hr tablet Take 500 mg by mouth as needed.     oxyCODONE (ROXICODONE) 20 mg Tab immediate release tablet Take 20 mg by mouth every 4 (four) hours as needed.    promethazine (PHENERGAN) 25 MG tablet Take 25 mg by mouth every 6 (six) hours as needed.    TRUE METRIX GLUCOSE TEST STRIP Strp CHECK BLOOD GLUCOSE ONCE DAILY     No current facility-administered medications for this visit.      Facility-Administered Medications Ordered in Other Visits   Medication    0.9%  NaCl infusion    heparin infusion 1,000 units/500 ml in 0.9% NaCl (pressure line flush)       Objective:    NOT DONE- VIDEO VISIT      Assessment:       1. HCC (hepatocellular carcinoma)    2. Liver transplant candidate    3. Other cirrhosis of liver         Plan:   HCV cirrhosis with advanced HCC,  He responded well to Y90 given in Oct 2018 and he is considered down-staged and suitable for listing for liver Tx.  Active on list since Sep 2019 with a MELD of 6.    Neither CT is TPCT. CT 7/2020 shows a hypodense lesion in segment 5 of the liver. The lesion appears treated. There is however, an area of washout in segment 6 that's indeterminate. CT 8/2020 shows development of a  loculated pleural effusion on the right and mild amount of fluid surrounding liver of uncertain etiology. Additional indeterminate areas of washout noted in segment 6. Rec repeat AFP and MRI now to evaluate for new lesions in the liver.    Has had more imaging in the last week- according to Dr Lora- no evidence of recurrence.    - Pleurex catheter removed  - patient is fully mobile but remains on home oxygen  - will need to get his scans to review here but also need to see hi min person to assess his frailty before reactivating him.    Clinic in 3 months.

## 2020-10-08 ENCOUNTER — TELEPHONE (OUTPATIENT)
Dept: TRANSPLANT | Facility: CLINIC | Age: 65
End: 2020-10-08

## 2020-10-08 NOTE — TELEPHONE ENCOUNTER
Labs dated Sept 2nd received but inr was not included. Results entered and hard copy sent for scanning.   Message left for Dr. Lora's nurse, requesting inr result to be faxed to 2 , if it was done.       Results of MR dated Sept 29th received. Result entered and hard copy sent for scanning. Spoke with Phyllis, [facility], to request radiology disc. Phyllis says she will burn the disc today and put it in the mail tonight.   Will submit for review at Liver Conference, once images available.

## 2020-10-14 DIAGNOSIS — Z76.82 LIVER TRANSPLANT CANDIDATE: ICD-10-CM

## 2020-10-14 DIAGNOSIS — C22.0 HCC (HEPATOCELLULAR CARCINOMA): Primary | ICD-10-CM

## 2020-10-14 NOTE — PROGRESS NOTES
Orders entered and appointment card completed for clinic follow-up in 3 months (Jan 2021) with labs.  Will follow-up on request for radiology disc.

## 2020-10-16 NOTE — TELEPHONE ENCOUNTER
Patient: Brandon Syed       MRN: [id number]      : 1955     Age: 64 y.o.  [address]  [place] [address]    Provider: Donnell    Priority of review: Cancer    Patient Transplant Status: Listed    Reason for presentation: Reassessment    Clinical Summary: 64 y.o. man who was first seen in Aug 2018 for evaluation of presumed HCC.  He has a h/o HCV cirrhosis, s/p treatment with IFN+RBV and victralis with SVR.      He has a h/o a subcentimeter liver mass, followed by serial imaging. In 2018  mass noted to be 24 mm and AFP was 13.8. Mass was biopsied no evidence of HCC; benign liver parenchyma with cirrhosis and moderate steatosis.     Pt reported severe abdominal pain starting 2018 accompanied by fatigue. He went to follow up and AFP repeated and had increased to 6360.      MRI 18 and a mass measuring 7.2x7.2x5.7 noted wih peripheral enhancement and pseudocapsule felt to be HCC. Furthermore, hepatic dome may be contiguous with the aforementioned mass and a second lesion may have coalesced with larger mass.       He has a h/o bladder carcinoma, noted 2017, s/p TURBT.  Last cystoscopy on 2020- all clear (Dr Hanny Keller)      Further PMH of HLD and DMII.      AFP 15965 on 2018  AFP 1.4 on 9/3/2019  AFP 0.7 on 2020    Imaging to be reviewed: MRI 20 (OSH)    HCC Treatment History: Yttrium 10/18/18    ABO: Conflict (See Lab Report): O NEG/O Pos    Platelets:   Lab Results   Component Value Date/Time    PLT 99 (L) 2020 08:22 AM    EXTPLATELETS 166 2020 09:00 AM     Creatinine:   Lab Results   Component Value Date/Time    CREATININE 0.9 2020 08:22 AM    EXTCREATININ 1.1 2020 09:00 AM     Bilirubin:   Lab Results   Component Value Date/Time    BILITOT 0.7 2020 08:22 AM    EXTBILITOTAL 0.5 2020 09:00 AM     AFP Last 3 each if available:   Lab Results   Component Value Date/Time    AFP 0.7 2020 08:22 AM    AFP 1.4 2019  11:02 AM    AFP 1.3 06/18/2019 09:02 AM    EXTAFP 0.9 09/02/2020 09:00 AM    EXTAFP 1.2 04/14/2020 12:41 PM       MELD: MELD-Na score: 6 at 7/9/2020  8:22 AM  MELD score: 6 at 7/9/2020  8:22 AM  Calculated from:  Serum Creatinine: 0.9 mg/dL (Rounded to 1 mg/dL) at 7/9/2020  8:22 AM  Serum Sodium: 141 mmol/L (Rounded to 137 mmol/L) at 7/9/2020  8:22 AM  Total Bilirubin: 0.7 mg/dL (Rounded to 1 mg/dL) at 7/9/2020  8:22 AM  INR(ratio): 1.0 at 7/9/2020  8:22 AM  Age: 64 years 6 months    Plan: Treated Foci in segment 5/8 look good, with no residual disease. 2.5 cm focus of irregular arterial enhancement in segment 6, posterior to treated sight. Washout on 3 minute delay. Concerning for recurrence.  AFP Is low.   Recommendations: Remains in Alexei. Could repeat MRI in 3 months with repeat AFP, Or Ablate/Tace       COMMITTEE DISCUSSION: Fluid collection in RLL c/f empyema. 2. 5 cm focus of enhancement with washout c/w recurrent dz. IR for repeat treatment, Ablatio vs TACE. Add for discuss at next Tuesday's noon meeting to discuss transplant status    Message forwarded to IR schedulers, requesting procedure date.   Added for discussion at noon meeting Oct 27th.    JANICE/JANICE Syed notified. Understanding expressed but patient request to defer scheduling until after f/u with Dr. Lora on Nov 6th. Says he will call back after the appointment to schedule.    Follow-up Provider: Ricardo Younger MD

## 2020-10-20 ENCOUNTER — CONFERENCE (OUTPATIENT)
Dept: TRANSPLANT | Facility: CLINIC | Age: 65
End: 2020-10-20

## 2020-10-23 ENCOUNTER — TELEPHONE (OUTPATIENT)
Dept: INTERVENTIONAL RADIOLOGY/VASCULAR | Facility: CLINIC | Age: 65
End: 2020-10-23

## 2020-10-23 NOTE — TELEPHONE ENCOUNTER
Left message for pt to return my call. Need to schedule IR consult. Please forward call to [phone]. Thanks

## 2020-10-28 ENCOUNTER — CONFERENCE (OUTPATIENT)
Dept: TRANSPLANT | Facility: CLINIC | Age: 65
End: 2020-10-28

## 2020-11-02 NOTE — TELEPHONE ENCOUNTER
TXP LIVER COORDINATOR LIVER DISCUSSION CONFERENCE     Brandon Syed's  case was discussed at liver discussion conference; re: discuss plan of care...transplant vs resection.     Discussion/Plan: 8/2018--8.1 cm lesion w/PV tumor thrombus, AFP ~24,000. Now w/new 2. 5 cm lesion. Listed (inactive) due to recent diagnosis of empyema. MELD 6. Exception request declined.     Plan: patient will need follow-up with Dr. Younger before reactivating. Will schedule surgery consult to discuss treatment options, including possible resection, at same time.    M/M Yahaira notified and request to defer scheduling until after follow-up with Dr. Lora on Nov 6th.

## 2020-11-09 ENCOUNTER — TELEPHONE (OUTPATIENT)
Dept: TRANSPLANT | Facility: CLINIC | Age: 65
End: 2020-11-09

## 2020-11-09 NOTE — TELEPHONE ENCOUNTER
Ms Agarwal would like to be called about a new liver spot she was told was seen spreaded on her  ms agarwal stated she went to visit her local liver doctor whom is dr regino leonard  and she was told by here there is no new spot scene

## 2020-11-11 ENCOUNTER — CLINICAL SUPPORT (OUTPATIENT)
Dept: INTERVENTIONAL RADIOLOGY/VASCULAR | Facility: CLINIC | Age: 65
End: 2020-11-11
Payer: MEDICARE

## 2020-11-11 DIAGNOSIS — R16.0 LIVER MASS: ICD-10-CM

## 2020-11-11 DIAGNOSIS — C22.0 HCC (HEPATOCELLULAR CARCINOMA): Primary | ICD-10-CM

## 2020-11-11 PROCEDURE — 99213 OFFICE O/P EST LOW 20 MIN: CPT | Mod: 95,NTX,, | Performed by: RADIOLOGY

## 2020-11-11 PROCEDURE — 99213 PR OFFICE/OUTPT VISIT, EST, LEVL III, 20-29 MIN: ICD-10-PCS | Mod: 95,NTX,, | Performed by: RADIOLOGY

## 2020-11-11 NOTE — PROGRESS NOTES
Subjective:       Patient ID: Brandon Syed is a 64 y.o. male.    Chief Complaint: No chief complaint on file.    64 y.o. man HCC and HCV cirrhosis, s/p treatment with IFN+RBV and victralis with SVR.      In 2018  mass noted to be 24 mm and AFP was 13.8. Mass was biopsied no evidence of HCC; benign liver parenchyma with cirrhosis and moderate steatosis.  Pt reported severe abdominal pain starting 2018 accompanied by fatigue. He went to follow up and AFP repeated and had increased to 6360. MRI 18 and a mass measuring 7.2x7.2x5.7 noted wih peripheral enhancement and pseudocapsule felt to be HCC. Furthermore, hepatic dome may be contiguous with the aforementioned mass and a second lesion may have coalesced with larger mass. s/p Hepatic radioembolization 10/18/2018 (Dr. Ramirez).      PMH significant for h/o bladder carcinoma (noted 2017, s/p TURBT; Last cystoscopy on 2020- all clear (Dr Hanny Keller)), HLD and DMII.     Most recent imaging MRI at OSH reviewed at liver conference 10/20/20.         Patient is on 2L NC for sequela related to recent VATS in August.    Review of Systems      Objective:      Physical Exam  Constitutional:       Appearance: Normal appearance.   HENT:      Head: Normocephalic and atraumatic.      Nose: Nose normal.   Pulmonary:      Effort: Pulmonary effort is normal.   Neurological:      General: No focal deficit present.      Mental Status: He is alert and oriented to person, place, and time.           IMAGING    Liver conference notes 10/20/20:  Treated Foci in segment 5/8 look good, with no residual disease. 2.5 cm focus of irregular arterial enhancement in segment 6, posterior to treated sight. Washout on 3 minute delay. Concerning for recurrence.  AFP Is low. Recommend IR for repeat treatment.  Discussed with Dr. Ramirez, recommends ablation.      ECO  MELD-Na score: 6 at 2020  8:22 AM  MELD score: 6 at 2020  8:22 AM  Calculated from:  Serum Creatinine:  0.9 mg/dL (Rounded to 1 mg/dL) at 7/9/2020  8:22 AM  Serum Sodium: 141 mmol/L (Rounded to 137 mmol/L) at 7/9/2020  8:22 AM  Total Bilirubin: 0.7 mg/dL (Rounded to 1 mg/dL) at 7/9/2020  8:22 AM  INR(ratio): 1.0 at 7/9/2020  8:22 AM  Age: 64 years 6 months  Child Griffin: B7  Transplant Status: listed; inactive    Assessment:       No diagnosis found.    Plan:       The patient location is: [place]  The chief complaint leading to consultation is: HCC    Visit type: audiovisual    Face to Face time with patient: 20  30 minutes of total time spent on the encounter, which includes face to face time and non-face to face time preparing to see the patient (eg, review of tests), Obtaining and/or reviewing separately obtained history, Documenting clinical information in the electronic or other health record, Independently interpreting results (not separately reported) and communicating results to the patient/family/caregiver, or Care coordination (not separately reported).         Each patient to whom he or she provides medical services by telemedicine is:  (1) informed of the relationship between the physician and patient and the respective role of any other health care provider with respect to management of the patient; and (2) notified that he or she may decline to receive medical services by telemedicine and may withdraw from such care at any time.    Notes:   Plan for MWA of new area of concern for HCC with general anesthesia on 12/15 with Dr. Ramirez.      Discussed how the procedure will be performed, risks (including, but not limited to, pain, bleeding, infection, damage to nearby structures, and the need for additional procedures), benefits, possible complications, pre-post procedure expectations, and alternatives. The patient voices understanding and all questions have been answered.  The patient agrees to proceed as planned. Patient scheduled for MWA of right hepatic lobe tumor with general anesthesia.    Mushtaq  MD Jamil  Diagnostic and Interventional Radiologist  Department of Radiology  Pager: [phone]

## 2020-11-18 ENCOUNTER — EVALUATION (OUTPATIENT)
Dept: TRANSPLANT | Facility: CLINIC | Age: 65
End: 2020-11-18
Payer: MEDICARE

## 2020-11-18 ENCOUNTER — OFFICE VISIT (OUTPATIENT)
Dept: TRANSPLANT | Facility: CLINIC | Age: 65
End: 2020-11-18
Payer: MEDICARE

## 2020-11-18 VITALS
WEIGHT: 155 LBS | WEIGHT: 155 LBS | OXYGEN SATURATION: 96 % | HEIGHT: 72 IN | HEART RATE: 87 BPM | BODY MASS INDEX: 20.99 KG/M2 | HEIGHT: 72 IN | DIASTOLIC BLOOD PRESSURE: 67 MMHG | TEMPERATURE: 98 F | SYSTOLIC BLOOD PRESSURE: 141 MMHG | OXYGEN SATURATION: 96 % | RESPIRATION RATE: 16 BRPM | BODY MASS INDEX: 20.99 KG/M2 | RESPIRATION RATE: 16 BRPM | HEART RATE: 87 BPM | TEMPERATURE: 98 F | DIASTOLIC BLOOD PRESSURE: 67 MMHG | SYSTOLIC BLOOD PRESSURE: 141 MMHG

## 2020-11-18 DIAGNOSIS — Z86.19 HX OF HEPATITIS C: ICD-10-CM

## 2020-11-18 DIAGNOSIS — C22.0 HCC (HEPATOCELLULAR CARCINOMA): Primary | ICD-10-CM

## 2020-11-18 PROCEDURE — 99214 OFFICE O/P EST MOD 30 MIN: CPT | Mod: S$PBB,TXP,, | Performed by: INTERNAL MEDICINE

## 2020-11-18 PROCEDURE — 99499 UNLISTED E&M SERVICE: CPT | Mod: S$PBB,TXP,, | Performed by: TRANSPLANT SURGERY

## 2020-11-18 PROCEDURE — 99214 PR OFFICE/OUTPT VISIT, EST, LEVL IV, 30-39 MIN: ICD-10-PCS | Mod: S$PBB,TXP,, | Performed by: INTERNAL MEDICINE

## 2020-11-18 PROCEDURE — 99213 OFFICE O/P EST LOW 20 MIN: CPT | Mod: PBBFAC,27,TXP | Performed by: INTERNAL MEDICINE

## 2020-11-18 PROCEDURE — 99999 PR PBB SHADOW E&M-EST. PATIENT-LVL III: CPT | Mod: PBBFAC,TXP,,

## 2020-11-18 PROCEDURE — 99999 PR PBB SHADOW E&M-EST. PATIENT-LVL III: ICD-10-PCS | Mod: PBBFAC,TXP,,

## 2020-11-18 PROCEDURE — 99999 PR PBB SHADOW E&M-EST. PATIENT-LVL III: CPT | Mod: PBBFAC,TXP,, | Performed by: INTERNAL MEDICINE

## 2020-11-18 PROCEDURE — 99999 PR PBB SHADOW E&M-EST. PATIENT-LVL III: ICD-10-PCS | Mod: PBBFAC,TXP,, | Performed by: INTERNAL MEDICINE

## 2020-11-18 PROCEDURE — 99499 NO LOS: ICD-10-PCS | Mod: S$PBB,TXP,, | Performed by: TRANSPLANT SURGERY

## 2020-11-18 PROCEDURE — 99213 OFFICE O/P EST LOW 20 MIN: CPT | Mod: PBBFAC,TXP

## 2020-11-18 NOTE — PROGRESS NOTES
Subjective:       Patient ID: Brandon Syed is a 64 y.o. male.    Chief Complaint: Waitlist Maintenance    HPI  I saw this 64 y.o. man who was first seen in Aug 2018 for evaluation of presumed HCC.  He has a h/o HCV cirrhosis, s/p treatment with IFN+RBV and victralis with SVR.     He has a h/o a subcentimeter liver mass, followed by serial imaging. In 02/2018  mass noted to be 24 mm and AFP was 13.8. Mass was biopsied no evidence of HCC; benign liver parenchyma with cirrhosis and moderate steatosis.    Pt reported severe abdominal pain starting 07/2018 accompanied by fatigue. He went to follow up and AFP repeated and had increased to 6360.      MRI 08/20/18 and a mass measuring 7.2x7.2x5.7 noted wih peripheral enhancement and pseudocapsule felt to be HCC. Furthermore, hepatic dome may be contiguous with the aforementioned mass and a second lesion may have coalesced with larger mass.       He has a h/o bladder carcinoma, noted 07/2017, s/p TURBT.  Last cystoscopy on Jan 6 2020- all clear (Dr Hanny Keller)     Further PMH of HLD and DMII.     AFP 58431 on 8/29/2018  AFP 1.4 on 9/3/2019  AFP 0.7 on 7/9/2020    Recent issues with an empyema- IV and now oral antibiotics + surgical management with thoracotomy  - discussed with Dr Lora  - no malignancy found- extensive tests including cytology and pleural biopsy    Y90 on 10/18/18    He was followed with Dr Lora (Oncology, [facility])  - on lenvatinib- treatment interrupted after empyema was discovered.    Since then he was evaluated and was listed for transplantation on 9/30/2019 given that he was downstaged to within Alexei criteria.    PMH:  aortofemoral bypass- 2016    SH:  Ex smoker- stopped 2014 ( 40 year pack smoker)    Review of Systems   Constitutional: Negative for activity change, appetite change, chills, fatigue, fever and unexpected weight change.   HENT: Negative for hearing loss.    Eyes: Negative for discharge and visual disturbance.    Respiratory: Negative for cough, chest tightness, shortness of breath and wheezing.    Cardiovascular: Negative for chest pain, palpitations and leg swelling.   Gastrointestinal: Negative for abdominal distention, abdominal pain, constipation, diarrhea and nausea.   Genitourinary: Negative for dysuria and frequency.   Musculoskeletal: Negative for arthralgias and back pain.   Skin: Negative for pallor and rash.   Neurological: Negative for dizziness, tremors, speech difficulty and headaches.   Hematological: Negative for adenopathy.   Psychiatric/Behavioral: Negative for agitation and confusion.            Lab Results   Component Value Date    ALT 18 07/09/2020    AST 37 07/09/2020     (H) 07/25/2019    ALKPHOS 168 (H) 07/09/2020    BILITOT 0.7 07/09/2020     Past Medical History:   Diagnosis Date    Arthritis     Bladder cancer 2017    s/p TURBT    Chronic lower back pain     Diabetes     Hx of hepatitis C     Hyperlipemia     Upper back pain      Past Surgical History:   Procedure Laterality Date    BLADDER SURGERY      TURBT    FEMORAL BYPASS       Current Outpatient Medications   Medication Sig    clindamycin phosphate 1% (CLINDAGEL) 1 % gel     DEXILANT 60 mg capsule Take 60 mg by mouth once daily.    diazePAM (VALIUM) 5 MG tablet Take 5 mg by mouth 2 (two) times daily.     fluticasone propionate (FLONASE) 50 mcg/actuation nasal spray by Each Nostril route as needed.    lenvatinib (LENVIMA) 4 mg Cap Take by mouth once daily.    levothyroxine (SYNTHROID) 75 MCG tablet     levothyroxine (SYNTHROID) 88 MCG tablet Take 88 mcg by mouth once daily.     lovastatin (MEVACOR) 20 MG tablet Take 20 mg by mouth every evening.     metFORMIN (GLUCOPHAGE-XR) 500 MG 24 hr tablet Take 500 mg by mouth as needed.     oxyCODONE (ROXICODONE) 20 mg Tab immediate release tablet Take 20 mg by mouth every 4 (four) hours as needed.    promethazine (PHENERGAN) 25 MG tablet Take 25 mg by mouth every 6 (six)  hours as needed.    TRUE METRIX GLUCOSE TEST STRIP Strp CHECK BLOOD GLUCOSE ONCE DAILY     No current facility-administered medications for this visit.      Facility-Administered Medications Ordered in Other Visits   Medication    0.9%  NaCl infusion    heparin infusion 1,000 units/500 ml in 0.9% NaCl (pressure line flush)       Objective:       Frail and cachectic  On oxygen therapy- 2 liters    Chest- clear  HS: Normal, no murmur  Abdo: No ascites or masses  No edema    Assessment:       1. HCC (hepatocellular carcinoma)    2. Hx of hepatitis C        Plan:   HCV cirrhosis with advanced HCC,  He responded well to Y90 given in Oct 2018 and he is considered down-staged.  Active on list since Sep 2019 with a MELD of 6.    Neither CT is TPCT. CT 7/2020 shows a hypodense lesion in segment 5 of the liver. The lesion appears treated. There is however, an area of washout in segment 6 that's indeterminate. CT 8/2020 shows development of a loculated pleural effusion on the right and mild amount of fluid surrounding liver of uncertain etiology. Additional indeterminate areas of washout noted in segment 6.     I think he is currently frail and may have had some recurrence of his advanced HCC although there is no clear evidence of this on imaging.  However, his recent pneumonia and empyema have left him relatively frail and he has lost a lot of weight.    I don't think he is a candidate for liver Tx at this point and I told him and his daughter this today.  - suggest restarting lenvatinib    Clinic in 3 months but we will also discuss him in our meeting for delisting.

## 2020-11-18 NOTE — PROGRESS NOTES
Met with patient to discuss recommendations from IR conference and assess frailty. Patient is overall doing well, but continues to recover from decortication for empyema. Continues to require supplemental oxygen and has decreased functional status. He has a very good response to Y90 and systemic immunotherapy. Most recent imaging shows small lesion concerning for recurrence. This is amenable to ablation and will be treated in December.     We discussed the treatment options of liver resection and liver transplantation. I believe the risks of both of these surgical options would present far greater risk than benefit to him at this point. Based on our discussion in conference and my assessment in clinic, I would recommend continued surveillance and management with locoregional therapies. I do not anticipate he will be a candidate for liver transplantation in the future.     The patient and his daughter agreed with this plan. He will see Dr Younger as well who will continue to follow him for his liver disease.     Toño Silver MD

## 2020-11-18 NOTE — LETTER
November 20, 2020        Gurpreet Hernandez  [address] [facility]  [place] [address]  Phone: [phone]  Fax: [phone]     Liset Lora  [address] [facility]  [address]  [place] [address]  Phone: [phone]  Fax: [phone]             [facility]  [address]  [place] [address]  Phone: [phone]   Patient: Brandon Syed   MR Number: [id number]   YOB: 1955   Date of Visit: 11/18/2020       Dear Dr. Gurpreet Hernandez, Liset Lora    Thank you for referring Brandon Syed to me for evaluation. Attached you will find relevant portions of my assessment and plan of care.    If you have questions, please do not hesitate to call me. I look forward to following Brandon Syed along with you.    Sincerely,    Ricardo Younger MD    Enclosure    If you would like to receive this communication electronically, please contact [email or web address] or [phone] to request [employer] access.    [employer] is a tool which provides read-only access to select patient information with whom you have a relationship. Its easy to use and provides real time access to review your patients record including encounter summaries, notes, results, and demographic information.    If you feel you have received this communication in error or would no longer like to receive these types of communications, please e-mail [email or web address]

## 2020-11-24 ENCOUNTER — CONFERENCE (OUTPATIENT)
Dept: TRANSPLANT | Facility: CLINIC | Age: 65
End: 2020-11-24

## 2020-11-25 NOTE — TELEPHONE ENCOUNTER
TXP LIVER COORDINATOR LIVER DISCUSSION CONFERENCE     Brandon Syed's  case was discussed at liver discussion conference, RE: transplant candidacy.    Discussion/Plan: Patient is no longer a transplant candidate d/t frailty, co-morbidities, and oxygen dependency, and is to be removed from [facility] liver transplant waitlist

## 2020-12-03 NOTE — TELEPHONE ENCOUNTER
12/3/20:    JANICE/JANICE SolaresYahaira notified of committee's review and decision to remove him from the [facility] liver transplant waitlist. Advised, going forward, his care will be managed by the Hepatology staff.  Understanding expressed.     Patient says he has a follow-up with Dr. Lora December 10th to discuss starting oral medication and acknowledged appointment for ablation December 15th.     Message forwarded to Halima Washington RN.

## 2020-12-08 DIAGNOSIS — Z01.818 PRE-PROCEDURAL EXAMINATION: ICD-10-CM

## 2020-12-14 ENCOUNTER — DOCUMENTATION ONLY (OUTPATIENT)
Dept: PREADMISSION TESTING | Facility: HOSPITAL | Age: 65
End: 2020-12-14

## 2020-12-14 RX ORDER — CITALOPRAM 10 MG/1
10 TABLET ORAL EVERY MORNING
COMMUNITY

## 2020-12-14 RX ORDER — AMLODIPINE BESYLATE 5 MG/1
5 TABLET ORAL EVERY MORNING
COMMUNITY

## 2020-12-14 NOTE — PRE-PROCEDURE INSTRUCTIONS
PRE-OP INSTRUCTIONS TO PATIENT'S WIFE:Instructed to have nothing by mouth past midnight.It is ok to take AM medications with a few sips of water. Instructed to shower the night before surgery as well as the morning of surgery with an antibacterial soap like dial or hibiclens from the neck down.Encouraged to wear loose fitting, comfortable clothing .Medication instructions for pm prior to and am of surgery reviewed.Instructed  to avoid taking vitamins,supplements,aspirin or ibuprofen the am of surgery.Informed of the current visitor policy and advised  that one visitor may accompany each patient into the hospital and wait (socially distanced) until a member of the medical team provides an update at the conclusion of the procedure.When they enter the hospital both patient and visitor will have their temperature checked.All visitors are asked to arrive with a mask and to keep their mask on throughout the visit. Patient stated an understanding.    Patient's wife denies patient having any side effects or issues with anesthesia or sedation.

## 2020-12-15 ENCOUNTER — ANESTHESIA (OUTPATIENT)
Dept: INTERVENTIONAL RADIOLOGY/VASCULAR | Facility: HOSPITAL | Age: 65
End: 2020-12-15
Payer: MEDICARE

## 2020-12-15 ENCOUNTER — HOSPITAL ENCOUNTER (OUTPATIENT)
Dept: INTERVENTIONAL RADIOLOGY/VASCULAR | Facility: HOSPITAL | Age: 65
Discharge: HOME | End: 2020-12-15
Attending: RADIOLOGY
Payer: MEDICARE

## 2020-12-15 VITALS
SYSTOLIC BLOOD PRESSURE: 144 MMHG | OXYGEN SATURATION: 98 % | WEIGHT: 154 LBS | TEMPERATURE: 99 F | BODY MASS INDEX: 20.89 KG/M2 | DIASTOLIC BLOOD PRESSURE: 65 MMHG | RESPIRATION RATE: 16 BRPM | HEART RATE: 68 BPM

## 2020-12-15 DIAGNOSIS — C22.0 HCC (HEPATOCELLULAR CARCINOMA): ICD-10-CM

## 2020-12-15 LAB
ALBUMIN SERPL BCP-MCNC: 3 G/DL (ref 3.5–5.2)
ALP SERPL-CCNC: 223 U/L (ref 55–135)
ALT SERPL W/O P-5'-P-CCNC: 11 U/L (ref 10–44)
ANION GAP SERPL CALC-SCNC: 6 MMOL/L (ref 8–16)
AST SERPL-CCNC: 34 U/L (ref 10–40)
BASOPHILS # BLD AUTO: 0.01 K/UL (ref 0–0.2)
BASOPHILS NFR BLD: 0.3 % (ref 0–1.9)
BILIRUB SERPL-MCNC: 0.6 MG/DL (ref 0.1–1)
BUN SERPL-MCNC: 9 MG/DL (ref 8–23)
CALCIUM SERPL-MCNC: 8.6 MG/DL (ref 8.7–10.5)
CHLORIDE SERPL-SCNC: 105 MMOL/L (ref 95–110)
CO2 SERPL-SCNC: 29 MMOL/L (ref 23–29)
CREAT SERPL-MCNC: 0.7 MG/DL (ref 0.5–1.4)
DIFFERENTIAL METHOD: ABNORMAL
EOSINOPHIL # BLD AUTO: 0.2 K/UL (ref 0–0.5)
EOSINOPHIL NFR BLD: 5.1 % (ref 0–8)
ERYTHROCYTE [DISTWIDTH] IN BLOOD BY AUTOMATED COUNT: 15.9 % (ref 11.5–14.5)
EST. GFR  (AFRICAN AMERICAN): >60 ML/MIN/1.73 M^2
EST. GFR  (NON AFRICAN AMERICAN): >60 ML/MIN/1.73 M^2
GLUCOSE SERPL-MCNC: 79 MG/DL (ref 70–110)
HCT VFR BLD AUTO: 33 % (ref 40–54)
HGB BLD-MCNC: 9.6 G/DL (ref 14–18)
IMM GRANULOCYTES # BLD AUTO: 0.01 K/UL (ref 0–0.04)
IMM GRANULOCYTES NFR BLD AUTO: 0.3 % (ref 0–0.5)
INR PPP: 1 (ref 0.8–1.2)
LYMPHOCYTES # BLD AUTO: 0.3 K/UL (ref 1–4.8)
LYMPHOCYTES NFR BLD: 10.8 % (ref 18–48)
MCH RBC QN AUTO: 25.9 PG (ref 27–31)
MCHC RBC AUTO-ENTMCNC: 29.1 G/DL (ref 32–36)
MCV RBC AUTO: 89 FL (ref 82–98)
MONOCYTES # BLD AUTO: 0.3 K/UL (ref 0.3–1)
MONOCYTES NFR BLD: 9.2 % (ref 4–15)
NEUTROPHILS # BLD AUTO: 2.2 K/UL (ref 1.8–7.7)
NEUTROPHILS NFR BLD: 74.3 % (ref 38–73)
NRBC BLD-RTO: 0 /100 WBC
PLATELET # BLD AUTO: 67 K/UL (ref 150–350)
PMV BLD AUTO: 10.7 FL (ref 9.2–12.9)
POCT GLUCOSE: 82 MG/DL (ref 70–110)
POCT GLUCOSE: 99 MG/DL (ref 70–110)
POTASSIUM SERPL-SCNC: 4.1 MMOL/L (ref 3.5–5.1)
PROT SERPL-MCNC: 7.6 G/DL (ref 6–8.4)
PROTHROMBIN TIME: 11.2 SEC (ref 9–12.5)
RBC # BLD AUTO: 3.71 M/UL (ref 4.6–6.2)
SARS-COV-2 RDRP RESP QL NAA+PROBE: NEGATIVE
SODIUM SERPL-SCNC: 140 MMOL/L (ref 136–145)
WBC # BLD AUTO: 2.95 K/UL (ref 3.9–12.7)

## 2020-12-15 PROCEDURE — 82962 GLUCOSE BLOOD TEST: CPT | Mod: 91

## 2020-12-15 PROCEDURE — 47382 PERCUT ABLATE LIVER RF: CPT | Mod: ,,, | Performed by: RADIOLOGY

## 2020-12-15 PROCEDURE — 25000003 PHARM REV CODE 250: Performed by: NURSE ANESTHETIST, CERTIFIED REGISTERED

## 2020-12-15 PROCEDURE — 37000009 HC ANESTHESIA EA ADD 15 MINS

## 2020-12-15 PROCEDURE — 77013 IR RF ABLATION LIVER TUMORS: ICD-10-PCS | Mod: 26,,, | Performed by: RADIOLOGY

## 2020-12-15 PROCEDURE — 63600175 PHARM REV CODE 636 W HCPCS: Performed by: FAMILY MEDICINE

## 2020-12-15 PROCEDURE — 77013 CT GUIDE FOR TISSUE ABLATION: CPT | Mod: 26,,, | Performed by: RADIOLOGY

## 2020-12-15 PROCEDURE — 85610 PROTHROMBIN TIME: CPT

## 2020-12-15 PROCEDURE — 47382 IR RF ABLATION LIVER TUMORS: ICD-10-PCS | Mod: ,,, | Performed by: RADIOLOGY

## 2020-12-15 PROCEDURE — D9220A PRA ANESTHESIA: Mod: ANES,,, | Performed by: ANESTHESIOLOGY

## 2020-12-15 PROCEDURE — D9220A PRA ANESTHESIA: ICD-10-PCS | Mod: ANES,,, | Performed by: ANESTHESIOLOGY

## 2020-12-15 PROCEDURE — 63600175 PHARM REV CODE 636 W HCPCS: Performed by: NURSE ANESTHETIST, CERTIFIED REGISTERED

## 2020-12-15 PROCEDURE — D9220A PRA ANESTHESIA: Mod: CRNA,,, | Performed by: NURSE ANESTHETIST, CERTIFIED REGISTERED

## 2020-12-15 PROCEDURE — 25000003 PHARM REV CODE 250: Performed by: NURSE PRACTITIONER

## 2020-12-15 PROCEDURE — 82962 GLUCOSE BLOOD TEST: CPT

## 2020-12-15 PROCEDURE — 80053 COMPREHEN METABOLIC PANEL: CPT

## 2020-12-15 PROCEDURE — 37000008 HC ANESTHESIA 1ST 15 MINUTES

## 2020-12-15 PROCEDURE — 63600175 PHARM REV CODE 636 W HCPCS: Performed by: ANESTHESIOLOGY

## 2020-12-15 PROCEDURE — 25000003 PHARM REV CODE 250: Performed by: RADIOLOGY

## 2020-12-15 PROCEDURE — U0002 COVID-19 LAB TEST NON-CDC: HCPCS

## 2020-12-15 PROCEDURE — D9220A PRA ANESTHESIA: ICD-10-PCS | Mod: CRNA,,, | Performed by: NURSE ANESTHETIST, CERTIFIED REGISTERED

## 2020-12-15 PROCEDURE — C1733 CATH, EP, OTHR THAN COOL-TIP: HCPCS

## 2020-12-15 PROCEDURE — 85025 COMPLETE CBC W/AUTO DIFF WBC: CPT

## 2020-12-15 RX ORDER — MIDAZOLAM HYDROCHLORIDE 1 MG/ML
INJECTION, SOLUTION INTRAMUSCULAR; INTRAVENOUS
Status: DISCONTINUED | OUTPATIENT
Start: 2020-12-15 | End: 2020-12-15

## 2020-12-15 RX ORDER — NEOSTIGMINE METHYLSULFATE 0.5 MG/ML
INJECTION, SOLUTION INTRAVENOUS
Status: DISCONTINUED | OUTPATIENT
Start: 2020-12-15 | End: 2020-12-15

## 2020-12-15 RX ORDER — ROCURONIUM BROMIDE 10 MG/ML
INJECTION, SOLUTION INTRAVENOUS
Status: DISCONTINUED | OUTPATIENT
Start: 2020-12-15 | End: 2020-12-15

## 2020-12-15 RX ORDER — HALOPERIDOL 5 MG/ML
1 INJECTION INTRAMUSCULAR ONCE
Status: COMPLETED | OUTPATIENT
Start: 2020-12-15 | End: 2020-12-15

## 2020-12-15 RX ORDER — SODIUM CHLORIDE 9 MG/ML
INJECTION, SOLUTION INTRAVENOUS ONCE
Status: DISCONTINUED | OUTPATIENT
Start: 2020-12-15 | End: 2020-12-16 | Stop reason: HOSPADM

## 2020-12-15 RX ORDER — ONDANSETRON 2 MG/ML
INJECTION INTRAMUSCULAR; INTRAVENOUS
Status: DISCONTINUED | OUTPATIENT
Start: 2020-12-15 | End: 2020-12-15

## 2020-12-15 RX ORDER — FENTANYL CITRATE 50 UG/ML
INJECTION, SOLUTION INTRAMUSCULAR; INTRAVENOUS
Status: DISCONTINUED | OUTPATIENT
Start: 2020-12-15 | End: 2020-12-15

## 2020-12-15 RX ORDER — HYDROMORPHONE HYDROCHLORIDE 1 MG/ML
0.2 INJECTION, SOLUTION INTRAMUSCULAR; INTRAVENOUS; SUBCUTANEOUS EVERY 5 MIN PRN
Status: DISCONTINUED | OUTPATIENT
Start: 2020-12-15 | End: 2020-12-16 | Stop reason: HOSPADM

## 2020-12-15 RX ORDER — LIDOCAINE HYDROCHLORIDE 10 MG/ML
INJECTION INFILTRATION; PERINEURAL CODE/TRAUMA/SEDATION MEDICATION
Status: COMPLETED | OUTPATIENT
Start: 2020-12-15 | End: 2020-12-15

## 2020-12-15 RX ORDER — PROPOFOL 10 MG/ML
VIAL (ML) INTRAVENOUS
Status: DISCONTINUED | OUTPATIENT
Start: 2020-12-15 | End: 2020-12-15

## 2020-12-15 RX ORDER — SUCCINYLCHOLINE CHLORIDE 20 MG/ML
INJECTION INTRAMUSCULAR; INTRAVENOUS
Status: DISCONTINUED | OUTPATIENT
Start: 2020-12-15 | End: 2020-12-15

## 2020-12-15 RX ORDER — KETAMINE HCL IN 0.9 % NACL 50 MG/5 ML
SYRINGE (ML) INTRAVENOUS
Status: DISCONTINUED | OUTPATIENT
Start: 2020-12-15 | End: 2020-12-15

## 2020-12-15 RX ORDER — LIDOCAINE HYDROCHLORIDE 20 MG/ML
INJECTION INTRAVENOUS
Status: DISCONTINUED | OUTPATIENT
Start: 2020-12-15 | End: 2020-12-15

## 2020-12-15 RX ORDER — LIDOCAINE HYDROCHLORIDE 10 MG/ML
1 INJECTION, SOLUTION EPIDURAL; INFILTRATION; INTRACAUDAL; PERINEURAL ONCE
Status: DISCONTINUED | OUTPATIENT
Start: 2020-12-15 | End: 2020-12-16 | Stop reason: HOSPADM

## 2020-12-15 RX ORDER — SODIUM CHLORIDE 0.9 % (FLUSH) 0.9 %
3 SYRINGE (ML) INJECTION
Status: DISCONTINUED | OUTPATIENT
Start: 2020-12-15 | End: 2020-12-16 | Stop reason: HOSPADM

## 2020-12-15 RX ADMIN — SODIUM CHLORIDE: 0.9 INJECTION, SOLUTION INTRAVENOUS at 08:12

## 2020-12-15 RX ADMIN — LIDOCAINE HYDROCHLORIDE 100 MG: 20 INJECTION, SOLUTION INTRAVENOUS at 08:12

## 2020-12-15 RX ADMIN — Medication 10 MG: at 09:12

## 2020-12-15 RX ADMIN — HALOPERIDOL LACTATE 1 MG: 5 INJECTION, SOLUTION INTRAMUSCULAR at 11:12

## 2020-12-15 RX ADMIN — SUCCINYLCHOLINE CHLORIDE 160 MG: 20 INJECTION, SOLUTION INTRAMUSCULAR; INTRAVENOUS at 08:12

## 2020-12-15 RX ADMIN — ROCURONIUM BROMIDE 10 MG: 10 INJECTION, SOLUTION INTRAVENOUS at 08:12

## 2020-12-15 RX ADMIN — MIDAZOLAM HYDROCHLORIDE 1 MG: 1 INJECTION, SOLUTION INTRAMUSCULAR; INTRAVENOUS at 08:12

## 2020-12-15 RX ADMIN — ONDANSETRON 4 MG: 2 INJECTION, SOLUTION INTRAMUSCULAR; INTRAVENOUS at 09:12

## 2020-12-15 RX ADMIN — PROPOFOL 190 MG: 10 INJECTION, EMULSION INTRAVENOUS at 08:12

## 2020-12-15 RX ADMIN — SUGAMMADEX 200 MG: 100 INJECTION, SOLUTION INTRAVENOUS at 10:12

## 2020-12-15 RX ADMIN — AMPICILLIN SODIUM AND SULBACTAM SODIUM 3 G: 2; 1 INJECTION, POWDER, FOR SOLUTION INTRAMUSCULAR; INTRAVENOUS at 08:12

## 2020-12-15 RX ADMIN — NEOSTIGMINE METHYLSULFATE 4 MG: 0.5 INJECTION INTRAVENOUS at 09:12

## 2020-12-15 RX ADMIN — Medication 20 MG: at 08:12

## 2020-12-15 RX ADMIN — FENTANYL CITRATE 50 MCG: 50 INJECTION, SOLUTION INTRAMUSCULAR; INTRAVENOUS at 08:12

## 2020-12-15 RX ADMIN — ROCURONIUM BROMIDE 40 MG: 10 INJECTION, SOLUTION INTRAVENOUS at 08:12

## 2020-12-15 RX ADMIN — LIDOCAINE HYDROCHLORIDE 5 ML: 10 INJECTION, SOLUTION INFILTRATION; PERINEURAL at 09:12

## 2020-12-15 NOTE — TRANSFER OF CARE
Anesthesia Transfer of Care Note    Patient: Brandon Syed    Procedure(s) Performed: * No procedures listed *    Patient location: PACU    Anesthesia Type: general    Transport from OR: Transported from OR on 6-10 L/min O2 by face mask with adequate spontaneous ventilation    Post pain: adequate analgesia    Post assessment: tolerated procedure well and no apparent anesthetic complications    Post vital signs: stable    Level of consciousness: awake    Nausea/Vomiting: no nausea/vomiting    Complications: none    Transfer of care protocol was followed      Last vitals:   Visit Vitals  /82   Pulse 68   Temp 36.7 °C (98.1 °F) (Temporal)   Resp 16   Wt 69.9 kg (154 lb)   SpO2 100%   BMI 20.89 kg/m²

## 2020-12-15 NOTE — H&P
Radiology History & Physical      SUBJECTIVE:     Chief Complaint: HCC    History of Present Illness:  Brandon Syed is a 64 y.o. male with HCC status post y90 treatment in 2018 with new lesion in hepatic segment VI who presents for microwave ablation.     Past Medical History:   Diagnosis Date    Arthritis     Bladder cancer 2017    s/p TURBT    Chronic lower back pain     Diabetes     Hx of hepatitis C     Hyperlipemia     Upper back pain      Past Surgical History:   Procedure Laterality Date    BLADDER SURGERY      TURBT    FEMORAL BYPASS         Home Meds:   Prior to Admission medications    Medication Sig Start Date End Date Taking? Authorizing Provider   amLODIPine (NORVASC) 5 MG tablet Take 5 mg by mouth every morning.   Yes Historical Provider   citalopram (CELEXA) 10 MG tablet Take 10 mg by mouth every morning.   Yes Historical Provider   DEXILANT 60 mg capsule Take 60 mg by mouth once daily. 8/13/18  Yes Historical Provider   diazePAM (VALIUM) 5 MG tablet Take 5 mg by mouth 2 (two) times daily.  6/3/19  Yes Historical Provider   levothyroxine (SYNTHROID) 88 MCG tablet Take 88 mcg by mouth once daily.  6/29/18  Yes Historical Provider   lovastatin (MEVACOR) 20 MG tablet Take 20 mg by mouth every evening.  8/17/18  Yes Historical Provider   oxyCODONE (ROXICODONE) 20 mg Tab immediate release tablet Take 30 mg by mouth every 4 (four) hours as needed.  7/1/19  Yes Historical Provider   promethazine (PHENERGAN) 25 MG tablet Take 25 mg by mouth every 6 (six) hours as needed. 7/5/19  Yes Historical Provider   clindamycin phosphate 1% (CLINDAGEL) 1 % gel  12/20/19   Historical Provider   fluticasone propionate (FLONASE) 50 mcg/actuation nasal spray by Each Nostril route as needed. 12/20/19   Historical Provider   lenvatinib (LENVIMA) 4 mg Cap Take by mouth once daily.    Historical Provider   TRUE METRIX GLUCOSE TEST STRIP Strp CHECK BLOOD GLUCOSE ONCE DAILY 10/7/19   Historical Provider      Anticoagulants/Antiplatelets: no anticoagulation    Allergies: Review of patient's allergies indicates:  No Known Allergies  Sedation History:  no adverse reactions    Review of Systems:   Hematological: no known coagulopathies  Respiratory: no shortness of breath  Cardiovascular: no chest pain  Gastrointestinal: no abdominal pain  Genito-Urinary: no dysuria  Musculoskeletal: negative  Neurological: no TIA or stroke symptoms         OBJECTIVE:     Vital Signs (Most Recent)  Temp: 98 °F (36.7 °C) (12/15/20 0657)  Pulse: 63 (12/15/20 0657)  Resp: 18 (12/15/20 0657)  BP: (Abnormal) 123/59 (12/15/20 0657)  SpO2: 100 % (12/15/20 0657)    Physical Exam:  ASA: per anesthesia   Mallampati: per anesthesia     General: no acute distress  Mental Status: alert and oriented to person, place and time  HEENT: normocephalic, atraumatic  Chest: unlabored breathing  Heart: regular heart rate  Abdomen: nondistended  Extremity: moves all extremities    Laboratory  Lab Results   Component Value Date    INR 1.0 07/09/2020       Lab Results   Component Value Date    WBC 5.12 07/09/2020    HGB 13.4 (L) 07/09/2020    HCT 42.3 07/09/2020    MCV 95 07/09/2020    PLT 99 (L) 07/09/2020      Lab Results   Component Value Date     07/09/2020     07/09/2020    K 4.1 07/09/2020     07/09/2020    CO2 26 07/09/2020    BUN 7 (L) 07/09/2020    CREATININE 0.9 07/09/2020    CALCIUM 8.6 (L) 07/09/2020    ALT 18 07/09/2020    AST 37 07/09/2020    ALBUMIN 3.0 (L) 07/09/2020    BILITOT 0.7 07/09/2020    BILIDIR 0.5 (H) 07/25/2019       ASSESSMENT/PLAN:     Sedation Plan: per anesthesia   Patient will undergo microwave ablation of a new lesion in hepatic segment VI.    Araceli Biggs, PGY-4

## 2020-12-15 NOTE — ANESTHESIA PROCEDURE NOTES
Intubation  Performed by: Janell Lerma CRNA  Authorized by: Darren Gonzales MD     Intubation:     Induction:  Intravenous    Intubated:  Postinduction    Mask Ventilation:  Moderately difficult with oral airway (2 person bag mask)    Attempts:  1    Attempted By:  CRNA    Method of Intubation:  Direct    Blade:  Viveros 2    Laryngeal View Grade: Grade I - full view of chords      Difficult Airway Encountered?: No      Complications:  None    Airway Device:  Oral endotracheal tube    Airway Device Size:  7.5    Style/Cuff Inflation:  Cuffed    Secured at:  The lips    Placement Verified By:  Capnometry    Complicating Factors:  None    Findings Post-Intubation:  BS equal bilateral and atraumatic/condition of teeth unchanged

## 2020-12-15 NOTE — PROGRESS NOTES
Discharge instructions reviewed w/ pt and wife over telephone, verbalized understanding. Pt in NADN. Chornic pain at baseline, pt states tolerable to go home w/. Tolerated liquids w/ no issues to be d/c'd home w/ family.

## 2020-12-15 NOTE — SEDATION DOCUMENTATION
Microwave liver tumor ablation complete. Pt tolerated well. VSS. No signs or symptoms of distress noted per CRNA  Pt will be transferred to PACU bed after extubation/stabilization and will be escorted by this RN and JACQUELYN Lerma who will give bedside report.

## 2020-12-15 NOTE — PLAN OF CARE
Pt arrived to 173 for microwave ablation of liver tumor. Pt escorted to room by this RN and JACQUELYN Lerma who will assume care of patient during procedure.  Dr Bonilla (anesthesia) to room as well.  Pt oriented to unit and staff. Plan of care reviewed with patient, patient verbalizes understanding. Comfort measures utilized. Pt safely transferred from stretcher to procedural table.  fall risk interventions maintained. Safety strap applied, positioner pillows utilized to minimize pressure points. Blankets applied. Pt prepped and draped utilizing standard sterile technique. Patient placed on continuous monitoring, as required by sedation policy. Timeouts completed utilizing standard universal time-out, per department and facility policy. RN to remain at bedside, continuous monitoring maintained. Pt resting comfortably. Denies pain/discomfort. Will continue to monitor. See flow sheets for monitoring, medication administration, and updates.

## 2020-12-15 NOTE — DISCHARGE INSTRUCTIONS
Understanding Radiofrequency Ablation (RFA) of Liver Tumors    Radiofrequency ablation (RFA) is a way to treat cancer that is growing in the liver. The treatment uses heat to kill cancer cells. A type of radio wave is sent into a liver tumor through wires (electrodes). This creates heat that kills the cells of the tumor without harming too many nearby healthy cells.  How to say it  [id number]-oh-FREE-yoseph-see [id number]   Why RFA of liver tumors is done  Radiofrequency ablation may be done if you cannot have liver surgery. Or it may be done in addition to surgery. Its most often done if you have a small number of tumors in your liver that are not over a certain size.  How RFA of liver tumors is done  The treatment is done in a hospital, and takes 1 to 3 hours. During the procedure:  · You are given medicine to make you relax or sleep through the treatment. The area to be treated may be numbed with medicine.  · The healthcare provider makes a small cut (incision) in your skin. In some cases, the provider makes several cuts and a long, thin tube with a tiny light and camera on the end (laparoscope) is put through one of the cuts. This is to help the provider see the procedure. In other cases, the provider may make one large cut instead.  · The provider puts a metal probe through the cut and into your liver. The probe may have several wires called electrodes. Or the probe may be a single electrode the size of a needle. The provider then uses imaging such as ultrasound or CT to help guide the probe into the tumor.  · The provider connects the probe to a machine that sends radio waves through the electrodes. This creates heat that kills the cells in the tumor. This is done for each tumor that needs to be treated. The tools are then removed, and the cuts in the skin are closed with stitches (sutures).  · After the treatment, you will stay in the hospital for 1 or more nights. Or you may be able to go home the same  day.  Risks of RFA of liver tumors  · Bleeding from the liver  · Leaking of bile from the liver  · Infection  · Damage to nearby organs, such as the gallbladder, intestines, or lungs  · Aches, fever, and upset stomach (nausea) for several days to several weeks (postablation syndrome)  · Need to repeat the procedure  Date Last Reviewed: 6/1/2016  © 8832-0390 [employer]. [address] [place] [address], [place] [address]. All rights reserved. This information is not intended as a substitute for professional medical care. Always follow your healthcare professional's instructions.

## 2020-12-15 NOTE — ANESTHESIA PREPROCEDURE EVALUATION
12/15/2020  Pre-operative evaluation for * No procedures listed *    Brandon Syed is a 64 y.o. male with Hep C/HCC, CLBP, DM2, PVD s/p fem bypass and chronic hypoxia following pneumonia/lung decortication 2 months ago (on 2L home O2) here for IR ablation. Pain regimen consists of Oxy 30 mg QID and Valium 5mg BID. Negative stress echo 6/2020, EF 65%     Patient Active Problem List   Diagnosis    HCC (hepatocellular carcinoma)    Liver mass    Pre-transplant evaluation for liver transplant    Hx of hepatitis C    Diabetes    Bladder cancer       Review of patient's allergies indicates:  No Known Allergies    Current Outpatient Medications on File Prior to Encounter   Medication Sig Dispense Refill    amLODIPine (NORVASC) 5 MG tablet Take 5 mg by mouth every morning.      citalopram (CELEXA) 10 MG tablet Take 10 mg by mouth every morning.      clindamycin phosphate 1% (CLINDAGEL) 1 % gel       DEXILANT 60 mg capsule Take 60 mg by mouth once daily.      diazePAM (VALIUM) 5 MG tablet Take 5 mg by mouth 2 (two) times daily.       fluticasone propionate (FLONASE) 50 mcg/actuation nasal spray by Each Nostril route as needed.      lenvatinib (LENVIMA) 4 mg Cap Take by mouth once daily.      levothyroxine (SYNTHROID) 88 MCG tablet Take 88 mcg by mouth once daily.       lovastatin (MEVACOR) 20 MG tablet Take 20 mg by mouth every evening.       oxyCODONE (ROXICODONE) 20 mg Tab immediate release tablet Take 30 mg by mouth every 4 (four) hours as needed.       promethazine (PHENERGAN) 25 MG tablet Take 25 mg by mouth every 6 (six) hours as needed.      TRUE METRIX GLUCOSE TEST STRIP Strp CHECK BLOOD GLUCOSE ONCE DAILY  11     Current Facility-Administered Medications on File Prior to Encounter   Medication Dose Route Frequency Provider Last Rate Last Dose    0.9%  NaCl infusion   Intravenous  Continuous Lis Sanabria DNP 75 mL/hr at 18 1115      heparin infusion 1,000 units/500 ml in 0.9% NaCl (pressure line flush)  500 mL Intravenous Continuous Lis Sanabria DNP           Past Surgical History:   Procedure Laterality Date    BLADDER SURGERY      TURBT    FEMORAL BYPASS         Social History     Socioeconomic History    Marital status:      Spouse name: Not on file    Number of children: Not on file    Years of education: Not on file    Highest education level: Not on file   Occupational History    Not on file   Social Needs    Financial resource strain: Not on file    Food insecurity     Worry: Not on file     Inability: Not on file    Transportation needs     Medical: Not on file     Non-medical: Not on file   Tobacco Use    Smoking status: Former Smoker     Quit date:      Years since quittin.9    Smokeless tobacco: Never Used   Substance and Sexual Activity    Alcohol use: No     Frequency: Never    Drug use: No    Sexual activity: Not on file   Lifestyle    Physical activity     Days per week: Not on file     Minutes per session: Not on file    Stress: Not on file   Relationships    Social connections     Talks on phone: Not on file     Gets together: Not on file     Attends Oriental orthodox service: Not on file     Active member of club or organization: Not on file     Attends meetings of clubs or organizations: Not on file     Relationship status: Not on file   Other Topics Concern    Not on file   Social History Narrative    Not on file         CBC: No results for input(s): WBC, RBC, HGB, HCT, PLT, MCV, MCH, MCHC in the last 72 hours.    CMP: No results for input(s): NA, K, CL, CO2, BUN, CREATININE, GLU, MG, PHOS, CALCIUM, ALBUMIN, PROT, ALKPHOS, ALT, AST, BILITOT in the last 72 hours.    INR  No results for input(s): PT, INR, PROTIME, APTT in the last 72 hours.        Diagnostic Studies:      EKD Echo:  No results found for this or any  previous visit.      Anesthesia Evaluation    I have reviewed the Patient Summary Reports.   I have reviewed the NPO Status.      Review of Systems  Anesthesia Hx:  History of prior surgery of interest to airway management or planning: Denies Family Hx of Anesthesia complications.   Denies Personal Hx of Anesthesia complications.       Physical Exam  General:  Well nourished    Airway/Jaw/Neck:  Airway Findings: Mouth Opening: Normal Tongue: Normal  General Airway Assessment: Adult  Mallampati: II  TM Distance: Normal, at least 6 cm      Dental:  Dental Findings: Edentulous   Chest/Lungs:  Chest/Lungs Findings: Clear to auscultation, Normal Respiratory Rate         Mental Status:  Mental Status Findings:  Cooperative, Alert and Oriented         Anesthesia Plan  Type of Anesthesia, risks & benefits discussed:  Anesthesia Type:  general  Patient's Preference:   Intra-op Monitoring Plan: standard ASA monitors  Intra-op Monitoring Plan Comments:   Post Op Pain Control Plan: multimodal analgesia  Post Op Pain Control Plan Comments:   Induction:   IV  Beta Blocker:  Patient is not currently on a Beta-Blocker (No further documentation required).       Informed Consent: Patient understands risks and agrees with Anesthesia plan.  Questions answered. Anesthesia consent signed with patient.  ASA Score: 3     Day of Surgery Review of History & Physical:    H&P update referred to the provider.         Ready For Surgery From Anesthesia Perspective.

## 2020-12-17 NOTE — ANESTHESIA POSTPROCEDURE EVALUATION
Anesthesia Post Evaluation    Patient: Brandon Syed    Procedure(s) Performed: * No procedures listed *    Final Anesthesia Type: general      Patient location during evaluation: PACU  Patient participation: Yes- Able to Participate  Level of consciousness: awake and alert and oriented  Post-procedure vital signs: reviewed and stable  Pain management: adequate  Airway patency: patent  LAURY mitigation strategies: Intraoperative administration of CPAP, nasopharyngeal airway, or oral appliance during sedation, Multimodal analgesia, Extubation while patient is awake, Verification of full reversal of neuromuscular block and Extubation and recovery carried out in lateral, semiupright, or other nonsupine position  PONV status at discharge: No PONV  Anesthetic complications: no      Cardiovascular status: hemodynamically stable  Respiratory status: unassisted  Hydration status: euvolemic  Follow-up not needed.          Vitals Value Taken Time   /65 12/15/20 1401   Temp 37.1 °C (98.8 °F) 12/15/20 1359   Pulse 68 12/15/20 1401   Resp 16 12/15/20 1359   SpO2 98 % 12/15/20 1401   Vitals shown include unvalidated device data.      Event Time   Out of Recovery 13:11:00         Pain/Ekaterina Score: No data recorded

## 2020-12-30 DIAGNOSIS — C22.0 HCC (HEPATOCELLULAR CARCINOMA): Primary | ICD-10-CM

## 2021-01-15 ENCOUNTER — HOSPITAL ENCOUNTER (OUTPATIENT)
Dept: RADIOLOGY | Facility: HOSPITAL | Age: 66
Discharge: HOME | End: 2021-01-15
Attending: RADIOLOGY
Payer: MEDICARE

## 2021-01-15 DIAGNOSIS — C22.0 HCC (HEPATOCELLULAR CARCINOMA): ICD-10-CM

## 2021-01-15 PROCEDURE — 25500020 PHARM REV CODE 255: Performed by: RADIOLOGY

## 2021-01-15 PROCEDURE — 74183 MRI ABDOMEN W WO CONTRAST: ICD-10-PCS | Mod: 26,,, | Performed by: RADIOLOGY

## 2021-01-15 PROCEDURE — 74183 MRI ABD W/O CNTR FLWD CNTR: CPT | Mod: TC

## 2021-01-15 PROCEDURE — A9585 GADOBUTROL INJECTION: HCPCS | Performed by: RADIOLOGY

## 2021-01-15 PROCEDURE — 74183 MRI ABD W/O CNTR FLWD CNTR: CPT | Mod: 26,,, | Performed by: RADIOLOGY

## 2021-01-15 RX ORDER — GADOBUTROL 604.72 MG/ML
8 INJECTION INTRAVENOUS
Status: COMPLETED | OUTPATIENT
Start: 2021-01-15 | End: 2021-01-15

## 2021-01-15 RX ADMIN — GADOBUTROL 8 ML: 604.72 INJECTION INTRAVENOUS at 12:01

## 2021-01-20 ENCOUNTER — CLINICAL SUPPORT (OUTPATIENT)
Dept: INTERVENTIONAL RADIOLOGY/VASCULAR | Facility: CLINIC | Age: 66
End: 2021-01-20
Payer: MEDICARE

## 2021-01-20 DIAGNOSIS — C22.0 HCC (HEPATOCELLULAR CARCINOMA): Primary | ICD-10-CM

## 2021-01-20 PROCEDURE — 99213 PR OFFICE/OUTPT VISIT, EST, LEVL III, 20-29 MIN: ICD-10-PCS | Mod: 95,,, | Performed by: PHYSICIAN ASSISTANT

## 2021-01-20 PROCEDURE — 99213 OFFICE O/P EST LOW 20 MIN: CPT | Mod: 95,,, | Performed by: PHYSICIAN ASSISTANT

## 2021-03-17 DIAGNOSIS — K76.9 LIVER DISEASE, UNSPECIFIED: ICD-10-CM

## 2021-03-17 DIAGNOSIS — C22.0 HCC (HEPATOCELLULAR CARCINOMA): Primary | ICD-10-CM

## 2021-03-22 ENCOUNTER — TELEPHONE (OUTPATIENT)
Dept: HEPATOLOGY | Facility: CLINIC | Age: 66
End: 2021-03-22

## 2022-04-18 NOTE — TELEPHONE ENCOUNTER
LCA Cine(s)  injected and visualized utilizing power injector system. Submitted for review at liver conference 6/25/19

## 2024-09-22 NOTE — TELEPHONE ENCOUNTER
Patient: Brandon Syed       MRN: [id number]      : 1955     Age: 63 y.o.  [address]  [place] [address]    Provider: Hepatologist Violette Younger    Urgency of review: urgent    Patient Transplant Status: Not a candidate    Reason for presentation: Reassessment    Clinical Summary:   63 M with hx of 8.1 cm HCC with tumor thrombus.  Previously reviewed, treated with y-90.  [place] [facility] [place] repeat - post treatment changes, peripheral enhancement.     No follow up with IR. Patient sees Dr. Lora, Oncology in [facility]. Patient called to review his most recent MRI.    Med list showed he is on lenvatinib.    Imaging to be reviewed: MRI from outside    HCC Treatment History: y-90 Oct 2018      Platelets:   Lab Results   Component Value Date/Time     10/18/2018 09:09 AM     Creatinine:   Lab Results   Component Value Date/Time    CREATININE 0.8 10/18/2018 09:09 AM     Bilirubin:   Lab Results   Component Value Date/Time    BILITOT 0.4 10/18/2018 09:09 AM     AFP Last 3 each if available:   Lab Results   Component Value Date/Time    AFP 35081 (H) 2018 05:00 PM       MELD: MELD-Na score: 6 at 10/18/2018  9:09 AM  MELD score: 6 at 10/18/2018  9:09 AM  Calculated from:  Serum Creatinine: 0.8 mg/dL (Rounded to 1 mg/dL) at 10/18/2018  9:09 AM  Serum Sodium: 140 mmol/L (Rounded to 137 mmol/L) at 10/18/2018  9:09 AM  Total Bilirubin: 0.4 mg/dL (Rounded to 1 mg/dL) at 10/18/2018  9:09 AM  INR(ratio): 0.9 (Rounded to 1) at 10/18/2018  9:09 AM  Age: 62 years    Plan:     Follow-up Provider:    We will call you when we receive swab results - usually about 3-4 days to result. You can take metronidazole for possible bacterial vaginosis. Take macrobid for urinary tract infection and drink plenty of fluids.  We will adjust therapy if necessary depending on the swab results.     Avoid bubble baths, scented soaps, douching or feminine sprays. Avoid tight fitting clothes and wear cotton underwear.